# Patient Record
Sex: FEMALE | Race: WHITE | NOT HISPANIC OR LATINO | Employment: FULL TIME | URBAN - METROPOLITAN AREA
[De-identification: names, ages, dates, MRNs, and addresses within clinical notes are randomized per-mention and may not be internally consistent; named-entity substitution may affect disease eponyms.]

---

## 2023-03-13 DIAGNOSIS — E78.5 HYPERLIPIDEMIA, UNSPECIFIED HYPERLIPIDEMIA TYPE: Primary | ICD-10-CM

## 2023-03-13 RX ORDER — ROSUVASTATIN CALCIUM 5 MG/1
5 TABLET, COATED ORAL EVERY EVENING
Qty: 90 TABLET | Refills: 0 | Status: SHIPPED | COMMUNITY
Start: 2023-03-13 | End: 2023-03-27 | Stop reason: SDUPTHER

## 2023-03-13 RX ORDER — ROSUVASTATIN CALCIUM 5 MG/1
1 TABLET, COATED ORAL EVERY EVENING
COMMUNITY
Start: 2020-10-02 | End: 2023-03-13 | Stop reason: SDUPTHER

## 2023-03-13 RX ORDER — ROSUVASTATIN CALCIUM 5 MG/1
5 TABLET, COATED ORAL DAILY
Qty: 14 TABLET | Refills: 0 | Status: SHIPPED | OUTPATIENT
Start: 2023-03-13 | End: 2023-05-23 | Stop reason: SDUPTHER

## 2023-03-13 NOTE — TELEPHONE ENCOUNTER
Pt calling to request short supply sent to her local pharmacy, she does not have enough to last until her mail order is received. Please advise.     Requested Prescriptions     Pending Prescriptions Disp Refills    rosuvastatin (Crestor) 5 mg tablet 90 tablet 1     Sig: Take 1 tablet (5 mg) by mouth once daily in the evening.

## 2023-03-27 RX ORDER — ROSUVASTATIN CALCIUM 5 MG/1
5 TABLET, COATED ORAL EVERY EVENING
Qty: 90 TABLET | Refills: 1 | Status: SHIPPED | OUTPATIENT
Start: 2023-03-27 | End: 2023-05-23 | Stop reason: SDUPTHER

## 2023-03-27 NOTE — TELEPHONE ENCOUNTER
Pt presented in office for this medication refill. It has not gurjit received by her mail order yet.

## 2023-04-13 LAB
ALANINE AMINOTRANSFERASE (SGPT) (U/L) IN SER/PLAS: 38 U/L (ref 7–45)
ALBUMIN (G/DL) IN SER/PLAS: 4.3 G/DL (ref 3.4–5)
ALKALINE PHOSPHATASE (U/L) IN SER/PLAS: 70 U/L (ref 33–110)
ANION GAP IN SER/PLAS: 12 MMOL/L (ref 10–20)
ASPARTATE AMINOTRANSFERASE (SGOT) (U/L) IN SER/PLAS: 33 U/L (ref 9–39)
BILIRUBIN TOTAL (MG/DL) IN SER/PLAS: 0.5 MG/DL (ref 0–1.2)
C REACTIVE PROTEIN (MG/L) IN SER/PLAS: 0.31 MG/DL
CALCIUM (MG/DL) IN SER/PLAS: 10 MG/DL (ref 8.6–10.3)
CARBON DIOXIDE, TOTAL (MMOL/L) IN SER/PLAS: 28 MMOL/L (ref 21–32)
CHLORIDE (MMOL/L) IN SER/PLAS: 102 MMOL/L (ref 98–107)
CREATININE (MG/DL) IN SER/PLAS: 0.91 MG/DL (ref 0.5–1.05)
ERYTHROCYTE DISTRIBUTION WIDTH (RATIO) BY AUTOMATED COUNT: 12.5 % (ref 11.5–14.5)
ERYTHROCYTE MEAN CORPUSCULAR HEMOGLOBIN CONCENTRATION (G/DL) BY AUTOMATED: 32.7 G/DL (ref 32–36)
ERYTHROCYTE MEAN CORPUSCULAR VOLUME (FL) BY AUTOMATED COUNT: 92 FL (ref 80–100)
ERYTHROCYTES (10*6/UL) IN BLOOD BY AUTOMATED COUNT: 4.44 X10E12/L (ref 4–5.2)
GFR FEMALE: 73 ML/MIN/1.73M2
GLUCOSE (MG/DL) IN SER/PLAS: 101 MG/DL (ref 74–99)
HEMATOCRIT (%) IN BLOOD BY AUTOMATED COUNT: 40.7 % (ref 36–46)
HEMOGLOBIN (G/DL) IN BLOOD: 13.3 G/DL (ref 12–16)
LEUKOCYTES (10*3/UL) IN BLOOD BY AUTOMATED COUNT: 7 X10E9/L (ref 4.4–11.3)
PLATELETS (10*3/UL) IN BLOOD AUTOMATED COUNT: 279 X10E9/L (ref 150–450)
POTASSIUM (MMOL/L) IN SER/PLAS: 4 MMOL/L (ref 3.5–5.3)
PROTEIN TOTAL: 7.9 G/DL (ref 6.4–8.2)
SEDIMENTATION RATE, ERYTHROCYTE: >130 MM/H (ref 0–30)
SODIUM (MMOL/L) IN SER/PLAS: 138 MMOL/L (ref 136–145)
UREA NITROGEN (MG/DL) IN SER/PLAS: 16 MG/DL (ref 6–23)

## 2023-05-23 ENCOUNTER — OFFICE VISIT (OUTPATIENT)
Dept: PRIMARY CARE | Facility: CLINIC | Age: 58
End: 2023-05-23
Payer: COMMERCIAL

## 2023-05-23 VITALS
WEIGHT: 250 LBS | DIASTOLIC BLOOD PRESSURE: 76 MMHG | SYSTOLIC BLOOD PRESSURE: 114 MMHG | TEMPERATURE: 98.3 F | BODY MASS INDEX: 42.91 KG/M2 | HEART RATE: 62 BPM | RESPIRATION RATE: 16 BRPM | OXYGEN SATURATION: 97 %

## 2023-05-23 DIAGNOSIS — G43.909 MIGRAINE WITHOUT STATUS MIGRAINOSUS, NOT INTRACTABLE, UNSPECIFIED MIGRAINE TYPE: ICD-10-CM

## 2023-05-23 DIAGNOSIS — E78.5 HYPERLIPIDEMIA, UNSPECIFIED HYPERLIPIDEMIA TYPE: Primary | ICD-10-CM

## 2023-05-23 DIAGNOSIS — M25.50 POLYARTHRALGIA: ICD-10-CM

## 2023-05-23 DIAGNOSIS — E55.9 VITAMIN D DEFICIENCY: ICD-10-CM

## 2023-05-23 DIAGNOSIS — I10 HYPERTENSION, UNSPECIFIED TYPE: ICD-10-CM

## 2023-05-23 DIAGNOSIS — Z78.0 POST-MENOPAUSAL: ICD-10-CM

## 2023-05-23 PROBLEM — K58.9 IRRITABLE BOWEL SYNDROME: Status: ACTIVE | Noted: 2023-05-23

## 2023-05-23 PROBLEM — R70.0 ELEVATED SED RATE: Status: ACTIVE | Noted: 2023-05-23

## 2023-05-23 PROBLEM — R22.31 MASS OF RIGHT FOREARM: Status: RESOLVED | Noted: 2023-05-23 | Resolved: 2023-05-23

## 2023-05-23 PROBLEM — M79.672 CHRONIC PAIN OF LEFT HEEL: Status: ACTIVE | Noted: 2023-05-23

## 2023-05-23 PROBLEM — M67.431 GANGLION OF RIGHT WRIST: Status: RESOLVED | Noted: 2023-05-23 | Resolved: 2023-05-23

## 2023-05-23 PROBLEM — R63.2 POLYPHAGIA: Status: ACTIVE | Noted: 2023-05-23

## 2023-05-23 PROBLEM — G47.33 OBSTRUCTIVE SLEEP APNEA: Status: ACTIVE | Noted: 2023-05-23

## 2023-05-23 PROBLEM — B00.1 RECURRENT COLD SORES: Status: ACTIVE | Noted: 2023-05-23

## 2023-05-23 PROBLEM — G89.29 CHRONIC LOW BACK PAIN: Status: ACTIVE | Noted: 2023-05-23

## 2023-05-23 PROBLEM — R42 DIZZINESS AND GIDDINESS: Status: ACTIVE | Noted: 2023-05-23

## 2023-05-23 PROBLEM — M54.50 CHRONIC LOW BACK PAIN: Status: ACTIVE | Noted: 2023-05-23

## 2023-05-23 PROBLEM — M54.2 NECK PAIN ON LEFT SIDE: Status: ACTIVE | Noted: 2023-05-23

## 2023-05-23 PROBLEM — E61.1 IRON DEFICIENCY: Status: ACTIVE | Noted: 2023-05-23

## 2023-05-23 PROBLEM — N80.9 ENDOMETRIOSIS: Status: ACTIVE | Noted: 2023-05-23

## 2023-05-23 PROBLEM — R22.31 MASS OF RIGHT WRIST: Status: RESOLVED | Noted: 2023-05-23 | Resolved: 2023-05-23

## 2023-05-23 PROBLEM — M25.531 RIGHT WRIST PAIN: Status: ACTIVE | Noted: 2023-05-23

## 2023-05-23 PROBLEM — E66.9 OBESE: Status: ACTIVE | Noted: 2023-05-23

## 2023-05-23 PROBLEM — G89.29 CHRONIC PAIN OF RIGHT HEEL: Status: ACTIVE | Noted: 2023-05-23

## 2023-05-23 PROBLEM — M79.671 CHRONIC PAIN OF RIGHT HEEL: Status: ACTIVE | Noted: 2023-05-23

## 2023-05-23 PROBLEM — G89.29 CHRONIC PAIN OF LEFT HEEL: Status: ACTIVE | Noted: 2023-05-23

## 2023-05-23 PROBLEM — Z98.84 BARIATRIC SURGERY STATUS: Status: ACTIVE | Noted: 2023-05-23

## 2023-05-23 PROCEDURE — 99214 OFFICE O/P EST MOD 30 MIN: CPT | Performed by: NURSE PRACTITIONER

## 2023-05-23 RX ORDER — ACYCLOVIR 50 MG/G
CREAM TOPICAL
COMMUNITY
Start: 2015-04-16 | End: 2023-11-20 | Stop reason: SDUPTHER

## 2023-05-23 RX ORDER — ATENOLOL 50 MG/1
50 TABLET ORAL DAILY
COMMUNITY
Start: 2016-12-19 | End: 2023-05-23 | Stop reason: SDUPTHER

## 2023-05-23 RX ORDER — HYDROCHLOROTHIAZIDE 12.5 MG/1
12.5 TABLET ORAL DAILY
Qty: 90 TABLET | Refills: 3 | Status: SHIPPED | OUTPATIENT
Start: 2023-05-23 | End: 2023-10-16 | Stop reason: SDUPTHER

## 2023-05-23 RX ORDER — HYDROCHLOROTHIAZIDE 12.5 MG/1
1 TABLET ORAL DAILY
COMMUNITY
End: 2023-05-23 | Stop reason: SDUPTHER

## 2023-05-23 RX ORDER — MULTIVITAMIN
1 TABLET ORAL
COMMUNITY

## 2023-05-23 RX ORDER — CALCIUM CARBONATE 260MG(650)
TABLET,CHEWABLE ORAL
COMMUNITY
End: 2024-04-09 | Stop reason: WASHOUT

## 2023-05-23 RX ORDER — ROSUVASTATIN CALCIUM 5 MG/1
5 TABLET, COATED ORAL DAILY
Qty: 90 TABLET | Refills: 3 | Status: SHIPPED | OUTPATIENT
Start: 2023-05-23 | End: 2023-06-22 | Stop reason: SDUPTHER

## 2023-05-23 RX ORDER — KETOROLAC TROMETHAMINE 10 MG/1
TABLET, FILM COATED ORAL
COMMUNITY
Start: 2022-10-24 | End: 2023-05-23 | Stop reason: ALTCHOICE

## 2023-05-23 RX ORDER — ATENOLOL 50 MG/1
50 TABLET ORAL DAILY
Qty: 90 TABLET | Refills: 3 | Status: SHIPPED | OUTPATIENT
Start: 2023-05-23 | End: 2023-10-16 | Stop reason: SDUPTHER

## 2023-05-23 RX ORDER — FLUTICASONE PROPIONATE 50 MCG
SPRAY, SUSPENSION (ML) NASAL
COMMUNITY
Start: 2019-08-23 | End: 2023-12-11 | Stop reason: SDUPTHER

## 2023-05-23 ASSESSMENT — ENCOUNTER SYMPTOMS
SHORTNESS OF BREATH: 0
BACK PAIN: 1
FATIGUE: 0
FEVER: 0
PALPITATIONS: 0
COUGH: 0
WHEEZING: 0

## 2023-05-23 NOTE — PROGRESS NOTES
Subjective   Patient ID: Lise Herring is a 57 y.o. female who presents for Med Refill.  She states she sees a new rheumatologist, who she likes very much, and who has told her she has OA, not ankylosing spondylitis, as was previously thought. She is doing well and was referred to pain management for RFA.    Review of Systems   Constitutional:  Negative for fatigue and fever.   Respiratory:  Negative for cough, shortness of breath and wheezing.    Cardiovascular:  Negative for chest pain, palpitations and leg swelling.   Musculoskeletal:  Positive for back pain.     Objective   /76 (BP Location: Left arm) Comment (BP Location): manual  Pulse 62   Temp 36.8 °C (98.3 °F)   Resp 16   Wt 113 kg (250 lb)   LMP  (Approximate) Comment: 2013  SpO2 97%   BMI 42.91 kg/m²     Physical Exam  Vitals reviewed.   Constitutional:       Appearance: Normal appearance.   HENT:      Head: Normocephalic.   Eyes:      Conjunctiva/sclera: Conjunctivae normal.   Cardiovascular:      Rate and Rhythm: Normal rate and regular rhythm.      Pulses: Normal pulses.   Pulmonary:      Breath sounds: Normal breath sounds.   Abdominal:      General: Bowel sounds are normal.      Palpations: Abdomen is soft.   Musculoskeletal:      Cervical back: Neck supple.   Skin:     General: Skin is warm and dry.   Neurological:      General: No focal deficit present.      Mental Status: She is alert.   Psychiatric:         Mood and Affect: Mood normal.         Thought Content: Thought content normal.     Assessment/Plan   1. Hyperlipidemia, unspecified hyperlipidemia type  Lipid Panel    rosuvastatin (Crestor) 5 mg tablet      2. Vitamin D deficiency        3. Post-menopausal  Vitamin D 25-Hydroxy,Total    TSH with reflex to Free T4 if abnormal      4. Hypertension, unspecified type  hydroCHLOROthiazide (HYDRODiuril) 12.5 mg tablet      5. Polyarthralgia        6. Migraine without status migrainosus, not intractable, unspecified migraine type   atenolol (Tenormin) 50 mg tablet        Follow-up with Dr. Woodson in 6 months.

## 2023-06-22 ENCOUNTER — HOSPITAL ENCOUNTER (OUTPATIENT)
Dept: DATA CONVERSION | Facility: HOSPITAL | Age: 58
End: 2023-06-22
Attending: PAIN MEDICINE | Admitting: PAIN MEDICINE
Payer: COMMERCIAL

## 2023-06-22 DIAGNOSIS — E78.5 HYPERLIPIDEMIA, UNSPECIFIED HYPERLIPIDEMIA TYPE: ICD-10-CM

## 2023-06-22 DIAGNOSIS — M47.816 SPONDYLOSIS WITHOUT MYELOPATHY OR RADICULOPATHY, LUMBAR REGION: ICD-10-CM

## 2023-06-22 RX ORDER — ROSUVASTATIN CALCIUM 5 MG/1
5 TABLET, COATED ORAL DAILY
Qty: 90 TABLET | Refills: 0 | Status: SHIPPED | OUTPATIENT
Start: 2023-06-22 | End: 2023-08-24 | Stop reason: ALTCHOICE

## 2023-06-22 NOTE — TELEPHONE ENCOUNTER
Patient is calling stating that [facility] did not receive her prescription for     Rosuvastatin 5 mg     Patient is asking that we please send this into [facility] as she will run out of the medication soon.  Patient can be reached at [phone].    Thank You

## 2023-06-22 NOTE — TELEPHONE ENCOUNTER
Requested Prescriptions     Pending Prescriptions Disp Refills    rosuvastatin (Crestor) 5 mg tablet 90 tablet 1     Sig: Take 1 tablet (5 mg) by mouth once daily.

## 2023-07-03 ENCOUNTER — LAB (OUTPATIENT)
Dept: LAB | Facility: LAB | Age: 58
End: 2023-07-03
Payer: COMMERCIAL

## 2023-07-03 DIAGNOSIS — E78.5 HYPERLIPIDEMIA, UNSPECIFIED HYPERLIPIDEMIA TYPE: ICD-10-CM

## 2023-07-03 DIAGNOSIS — Z78.0 POST-MENOPAUSAL: ICD-10-CM

## 2023-07-03 LAB
CALCIDIOL (25 OH VITAMIN D3) (NG/ML) IN SER/PLAS: 36 NG/ML
CHOLESTEROL (MG/DL) IN SER/PLAS: 165 MG/DL (ref 0–199)
CHOLESTEROL IN HDL (MG/DL) IN SER/PLAS: 53.9 MG/DL
CHOLESTEROL/HDL RATIO: 3.1
LDL: 84 MG/DL (ref 0–99)
SEDIMENTATION RATE, ERYTHROCYTE: 27 MM/H (ref 0–30)
THYROTROPIN (MIU/L) IN SER/PLAS BY DETECTION LIMIT <= 0.05 MIU/L: 0.77 MIU/L (ref 0.44–3.98)
TRIGLYCERIDE (MG/DL) IN SER/PLAS: 134 MG/DL (ref 0–149)
VLDL: 27 MG/DL (ref 0–40)

## 2023-07-03 PROCEDURE — 84443 ASSAY THYROID STIM HORMONE: CPT

## 2023-07-03 PROCEDURE — 80061 LIPID PANEL: CPT

## 2023-07-03 PROCEDURE — 82306 VITAMIN D 25 HYDROXY: CPT

## 2023-07-03 PROCEDURE — 36415 COLL VENOUS BLD VENIPUNCTURE: CPT

## 2023-07-06 LAB
ALBUMIN ELP: 4.2 G/DL (ref 3.4–5)
ALPHA 1: 0.3 G/DL (ref 0.2–0.6)
ALPHA 2: 0.7 G/DL (ref 0.4–1.1)
BETA: 0.9 G/DL (ref 0.5–1.2)
GAMMA GLOBULIN: 1.2 G/DL (ref 0.5–1.4)
PATH REVIEW-SERUM PROTEIN ELECTROPHORESIS: NORMAL
PROTEIN ELECTROPHORESIS INTERPRETATION: NORMAL
PROTEIN TOTAL: 7.4 G/DL (ref 6.4–8.2)

## 2023-07-20 ENCOUNTER — HOSPITAL ENCOUNTER (OUTPATIENT)
Dept: DATA CONVERSION | Facility: HOSPITAL | Age: 58
End: 2023-07-20
Attending: PAIN MEDICINE | Admitting: PAIN MEDICINE
Payer: COMMERCIAL

## 2023-07-20 DIAGNOSIS — M47.816 SPONDYLOSIS WITHOUT MYELOPATHY OR RADICULOPATHY, LUMBAR REGION: ICD-10-CM

## 2023-08-24 ENCOUNTER — OFFICE VISIT (OUTPATIENT)
Dept: PRIMARY CARE | Facility: CLINIC | Age: 58
End: 2023-08-24
Payer: COMMERCIAL

## 2023-08-24 VITALS
WEIGHT: 250 LBS | RESPIRATION RATE: 16 BRPM | HEART RATE: 61 BPM | SYSTOLIC BLOOD PRESSURE: 122 MMHG | OXYGEN SATURATION: 97 % | BODY MASS INDEX: 42.91 KG/M2 | DIASTOLIC BLOOD PRESSURE: 72 MMHG | TEMPERATURE: 97.8 F

## 2023-08-24 DIAGNOSIS — E66.01 MORBID OBESITY WITH BMI OF 40.0-44.9, ADULT (MULTI): Primary | ICD-10-CM

## 2023-08-24 DIAGNOSIS — G89.29 CHRONIC LOW BACK PAIN, UNSPECIFIED BACK PAIN LATERALITY, UNSPECIFIED WHETHER SCIATICA PRESENT: ICD-10-CM

## 2023-08-24 DIAGNOSIS — G47.00 INSOMNIA, UNSPECIFIED TYPE: ICD-10-CM

## 2023-08-24 DIAGNOSIS — M54.50 CHRONIC LOW BACK PAIN, UNSPECIFIED BACK PAIN LATERALITY, UNSPECIFIED WHETHER SCIATICA PRESENT: ICD-10-CM

## 2023-08-24 LAB
AMPHETAMINE (PRESENCE) IN URINE BY SCREEN METHOD: NORMAL
BARBITURATES PRESENCE IN URINE BY SCREEN METHOD: NORMAL
BENZODIAZEPINE (PRESENCE) IN URINE BY SCREEN METHOD: NORMAL
CANNABINOIDS IN URINE BY SCREEN METHOD: NORMAL
COCAINE (PRESENCE) IN URINE BY SCREEN METHOD: NORMAL
DRUG SCREEN COMMENT URINE: NORMAL
FENTANYL URINE: NORMAL
OPIATES (PRESENCE) IN URINE BY SCREEN METHOD: NORMAL
OXYCODONE (PRESENCE) IN URINE BY SCREEN METHOD: NORMAL
PHENCYCLIDINE (PRESENCE) IN URINE BY SCREEN METHOD: NORMAL

## 2023-08-24 PROCEDURE — 99214 OFFICE O/P EST MOD 30 MIN: CPT | Performed by: FAMILY MEDICINE

## 2023-08-24 PROCEDURE — 80307 DRUG TEST PRSMV CHEM ANLYZR: CPT

## 2023-08-24 RX ORDER — PHENTERMINE HYDROCHLORIDE 37.5 MG/1
37.5 TABLET ORAL
Qty: 30 TABLET | Refills: 0 | Status: SHIPPED | OUTPATIENT
Start: 2023-08-24 | End: 2023-09-18 | Stop reason: SDUPTHER

## 2023-08-24 RX ORDER — NAPROXEN 500 MG/1
TABLET ORAL
COMMUNITY
Start: 2023-07-28

## 2023-08-24 NOTE — PATIENT INSTRUCTIONS
Behavioral counseling for weight loss done today.  I would recommend that you track your calories daily for the next 2 weeks.  If you have Internet access, you may want to try [email or web address] [email or web address] to track your caloric intake and calories burned each day.  It is FREE to join.  Afterward, there are 3500 jeffrey in a pound of fat so try and restrict your caloric intake to 500 jeffrey less than your average over the 2-week period of time.  Example: If you consumed an average of 2000 jeffrey a day during those 2 weeks then restrict your calorie intake to no more than 1500 calories a day.  By doing so, 500 jeffrey x 7 days a week equals 3500 jeffrey and thus you should lose at least a pound a week.      Also exercise is extremely important.  Try to exercise at least 3-5 times a week for 30 minutes each.  Ultimate goal would be to try to achieve 10,000 steps a day.    Try to drink 32-64 ounces of water a day.    Return to clinic in 3.5 wks for recheck, sooner should problems arise

## 2023-08-24 NOTE — PROGRESS NOTES
Subjective   Patient ID: Lise Herring is a 58 y.o. female who presents for Med Refill and Weight Loss (Not sleeping, difficult to walk. Ready to look into options. She was approved a few yrs ago to do bariatric surgery but she does not want to go that option. ).    HPI   Patient comes in today for evaluation and discussion of her weight.  She is also having difficulty sleeping as well as difficulty walking.    She only gets about 4 hours of sleep per night.  She has chronic back problems which wake her up.  She recently had 6 injections in her back with RFA about a month ago and they have not helped.  She has a difficult time walking due to her back.  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  She recently went to the [employer] with her 3 yo niece and was exhausted by the end due to her weight and her back.    She has tried weight watchers, Atkins and counting calories along with keto diet in the past.  The keto diet has worked for her but she has trouble maintaining it.  She claims that she does not eat much.  She gets up in the morning and has a small breakfast, usually does not eat lunch and then usually has an early dinner because her mom lives with her and that is when they eat.  She states that she does not count calories but she does not think she eats many per day.  She has a friend who had gastric bypass surgery and gained all her weight back and thus she does not want to explore that option right now.    5/23/2023  Lise eHrring is a 57 y.o. female who presents for Med Refill.  She states she sees a new rheumatologist, who she likes very much, and who has told her she has OA, not ankylosing spondylitis, as was previously thought. She is doing well and was referred to pain management for RFA.        5/17/2022  Patient presents today for 6-month checkup and refill of meds. She currently is without complaints. She states that she is taking her medicines as directed and is not  "having any side effects from them.    Patient is frustrated because she has plateaued and is not really losing any weight. She is not able to get much exercise because of her polyarthralgia. She has gained 54 pounds over the past few years. She would like to have help with losing weight. 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 She states the keto diet works for her but \"it is so expensive\". She has considered bariatric surgery in the past.    11/5/2021  Patient presents today for 6-month checkup and refill of meds. She currently is without complaints. She states that she is taking her medicines as directed and is not having any side effects from them. Her 80-year-old mother lives with her and she has been very cautious about exposure to Covid. She needs a refill of all her medications.    She is frustrated. She has gained 45 pounds in the last year after successfully using the keto diet to she had 36 pounds. She states she has recently gone back on the keto diet but it is much slower going this time.    4/16/2021  Patient presents today for checkup and refill of meds. She currently is without complaints. She states that she is taking her medicines as directed and is not having any side effects from them. She got her 1st Covid vaccine last Friday the 9th. She is due for a lipid panel.    9/10/2020  Patient comes in today for follow-up on her medications and for lab work. Is now seeing Dr. Burt, rheumatology for her joint pains. She has been instructed by him to try stopping her cholesterol medication to see if that helps her discomfort. I would like to get one last check of her lipid panel before we do that. She is continuing to take her other medicines as directed.    1/30/2020  Lise is here fro a follow up on her neck pain. She had radiofrequency ablation of the right sacroiliac joint on 1/23/2020 by Dr. Alexis and she states so far it is working okay.  She has had the neck pain return on the left " "side of the neck from the base of the skull down to the base of the neck. It does not go down the arm. She denies any known trigger. She has a side sleeper. She sleeps on 2 thin pillows. Has been working on losing weight and she has been using the ketodiet and \"the carb manager dayo\" and is down 10 pounds from her last visit. The steroid ten-day course did take her neck pain away completely but it is now back to the same level.    1/6/2020  MARCI SHOEMAKER presents with complaints of gradual onset of moderate neck pain, described as sharp and throbbing.   Associated symptoms include no impaired range of motion.     MARCI SHOEMAKER presents with complaints of visit for: (refill of meds)   Patient comes in today complaining of neck pain. She states she woke up with had about 3 weeks ago and has never gone away. She describes it as a throbbing pain and every so often a stabbing pain and points to the base of her skull on the left.    Patient also is experiencing bilateral heel pain more so on the left. It bothers her when going from a resting position to walking.    Patient also is not happy with her weight. It has gradually gone up from 193 back in 2017 to 233 where she is today. She has gained 12 pounds since she was here in October. She states that a water pill seems to be doing well. She used to work as a  and  but isn't doing that kind of walking anymore. She would like to explore a Keto diet. She has a friend who lost a lot of weight on it. She is on cholesterol medicine. Patient has considered bariatric surgery as well back in the spring of 2019 and had seen a dietitian and gone through the presurgery evaluation.  evaluation. Patient has tried Weight Watchers, Atkins diet and counting calories in the past.  1   9/17/19  Patient comes in today for follow-up. She was here last month for first time and has been doing well. She is here today for follow-up on her cholesterol and refill of the medication. "     8/23/19  Patient comes in today complaining of her sinuses. She just bought a new house out this way in [place] and has been spending a lot of time outside doing yard work. She claims she doesn't usually have allergies this time of year.    Patient also has a cold sore on her upper lip for about 4 days. She usually uses Zovirax cream for it. She doesn't know about the Valtrex tabs. We had a discussion about this.     MARCI SHOEMAKER presents with complaints of cold symptoms starting 1 month ago.   Associated symptoms include nasal congestion, post nasal drainage, productive cough, facial pressure, facial pain and headache, but no sneezing, no runny nose, no scratchy throat, no sore throat, no hoarseness, no dry cough, no plugged ear(s), no ear pain, no swollen lymph nodes, no wheezing, no shortness of breath, no fatigue, no weakness, no nausea, no vomiting, no diarrhea, no fever and no chills.     Review of Systems   All other systems reviewed and are negative.      Objective   /72   Pulse 61   Temp 36.6 °C (97.8 °F)   Resp 16   Wt 113 kg (250 lb)   SpO2 97%   BMI 42.91 kg/m²     Physical Exam  Vitals reviewed.   Constitutional:       Appearance: She is morbidly obese.   HENT:      Head: Normocephalic and atraumatic.      Right Ear: Tympanic membrane, ear canal and external ear normal.      Left Ear: Tympanic membrane, ear canal and external ear normal.      Nose: Nose normal.      Mouth/Throat:      Mouth: Mucous membranes are moist.      Pharynx: Oropharynx is clear.   Eyes:      Extraocular Movements: Extraocular movements intact.      Conjunctiva/sclera: Conjunctivae normal.      Pupils: Pupils are equal, round, and reactive to light.   Cardiovascular:      Rate and Rhythm: Normal rate and regular rhythm.      Heart sounds: Normal heart sounds. No murmur heard.  Pulmonary:      Effort: Pulmonary effort is normal.      Breath sounds: Normal breath sounds. No wheezing.   Abdominal:       General: Abdomen is flat. Bowel sounds are normal.      Palpations: Abdomen is soft.   Musculoskeletal:         General: Normal range of motion.   Skin:     General: Skin is warm and dry.   Neurological:      General: No focal deficit present.      Mental Status: She is alert and oriented to person, place, and time. Mental status is at baseline.   Psychiatric:         Mood and Affect: Mood normal.         Behavior: Behavior normal.         Thought Content: Thought content normal.         Judgment: Judgment normal.         Assessment/Plan   Problem List Items Addressed This Visit       Chronic low back pain    Morbid obesity with BMI of 40.0-44.9, adult (CMS/[facility]) - Primary    Relevant Medications    phentermine (Adipex-P) 37.5 mg tablet    Other Relevant Orders    Drug Screen, Urine With Reflex to Confirmation (Completed)     Other Visit Diagnoses       Insomnia, unspecified type            CSA phentermine and RUDS today.  Take new medication as directed.  Continue other medications as directed.  RTC in 3-1/2 weeks for recheck, sooner should problems arise.  Medication list reconciled.  Thank you for visiting today!      Professional services: Some of this note was completed using Dragon voice technology and sometimes the software misinterprets words. This may include unintended errors with respect to translation of words, typographical errors or grammar errors which may not have been identified prior to finalization of the chart note. Please take this into account when reading the note. Thank you.

## 2023-09-18 ENCOUNTER — OFFICE VISIT (OUTPATIENT)
Dept: PRIMARY CARE | Facility: CLINIC | Age: 58
End: 2023-09-18
Payer: COMMERCIAL

## 2023-09-18 VITALS
DIASTOLIC BLOOD PRESSURE: 82 MMHG | WEIGHT: 235 LBS | SYSTOLIC BLOOD PRESSURE: 114 MMHG | HEART RATE: 73 BPM | TEMPERATURE: 97.9 F | RESPIRATION RATE: 20 BRPM | OXYGEN SATURATION: 98 % | BODY MASS INDEX: 40.34 KG/M2

## 2023-09-18 DIAGNOSIS — E66.01 MORBID OBESITY WITH BMI OF 40.0-44.9, ADULT (MULTI): ICD-10-CM

## 2023-09-18 PROCEDURE — 99213 OFFICE O/P EST LOW 20 MIN: CPT | Performed by: FAMILY MEDICINE

## 2023-09-18 RX ORDER — PHENTERMINE HYDROCHLORIDE 37.5 MG/1
37.5 TABLET ORAL
Qty: 30 TABLET | Refills: 0 | Status: SHIPPED | OUTPATIENT
Start: 2023-09-18 | End: 2023-10-16 | Stop reason: SDUPTHER

## 2023-09-18 NOTE — PROGRESS NOTES
Subjective   Patient ID: Lise Herring is a 58 y.o. female who presents for Follow-up (For adipex).    OARRS:  Sriram Woodson DO on 9/18/2023  3:16 PM  I have personally reviewed the OARRS report for Lise Herring. I have considered the risks of abuse, dependence, addiction and diversion    Is the patient prescribed a combination of a benzodiazepine and opioid?  No    Last Urine Drug Screen / ordered today: No  Recent Results (from the past 8760 hour(s))   Drug Screen, Urine With Reflex to Confirmation    Collection Time: 08/24/23  4:10 PM   Result Value Ref Range    DRUG SCREEN COMMENT URINE SEE BELOW     Amphetamine Screen, Urine PRESUMPTIVE NEGATIVE NEGATIVE    Barbiturate Screen, Urine PRESUMPTIVE NEGATIVE NEGATIVE    BENZODIAZEPINE (PRESENCE) IN URINE BY SCREEN METHOD PRESUMPTIVE NEGATIVE NEGATIVE    Cannabinoid Screen, Urine PRESUMPTIVE NEGATIVE NEGATIVE    Cocaine Screen, Urine PRESUMPTIVE NEGATIVE NEGATIVE    Fentanyl, Ur PRESUMPTIVE NEGATIVE NEGATIVE    Opiate Screen, Urine PRESUMPTIVE NEGATIVE NEGATIVE    Oxycodone Screen, Ur PRESUMPTIVE NEGATIVE NEGATIVE    PCP Screen, Urine PRESUMPTIVE NEGATIVE NEGATIVE     Results are as expected.   Clinical rationale for not completing a Urine Drug Screen:  Done 8/24/2023    Controlled Substance Agreement:  Date of the Last Agreement: 8/24/2023  Reviewed Controlled Substance Agreement including but not limited to the benefits, risks, and alternatives to treatment with a Controlled Substance medication(s).    Anorexiants:   What is the patient's goal of therapy?  To assist in weight loss  Is this being achieved with current treatment?  Yes    I have assessed the patient's continuing efforts to lose weight.    Patient has demonstrated continued efforts to lose weight, is dedicated to the treatment program and the response to treatment.    Activities of Daily Living:   Is your overall impression that this patient is benefiting (symptom reduction outweighs side  effects) from anorexiants therapy? Yes     1. Physical Functioning: Better  2. Family Relationship: Better  3. Social Relationship: Better  4. Mood: Better  5. Sleep Patterns: Better  6. Overall Function: Better    HPI   Patient comes in today for follow-up on her phentermine medication.  So far she is doing very well with the medication.  She has having some slight dry mouth and dry eyes from the medication but otherwise tolerating it well.  She thus far has lost 15 pounds.    She lives with her mother who had a stroke about 7 years ago and cannot speak.    8/24/2023   Patient comes in today for evaluation and discussion of her weight.  She is also having difficulty sleeping as well as difficulty walking.    She only gets about 4 hours of sleep per night.  She has chronic back problems which wake her up.  She recently had 6 injections in her back with RFA about a month ago and they have not helped.  She has a difficult time walking due to her back.  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  She recently went to the [employer] with her 3 yo niece and was exhausted by the end due to her weight and her back.    She has tried weight watchers, Atkins and counting calories along with keto diet in the past.  The keto diet has worked for her but she has trouble maintaining it.  She claims that she does not eat much.  She gets up in the morning and has a small breakfast, usually does not eat lunch and then usually has an early dinner because her mom lives with her and that is when they eat.  She states that she does not count calories but she does not think she eats many per day.  She has a friend who had gastric bypass surgery and gained all her weight back and thus she does not want to explore that option right now.    Review of Systems   All other systems reviewed and are negative.      Objective   /82   Pulse 73   Temp 36.6 °C (97.9 °F)   Resp 20   Wt 107 kg (235 lb)   SpO2 98%    BMI 40.34 kg/m²     Physical Exam  Vitals reviewed.   Constitutional:       Appearance: She is morbidly obese.   HENT:      Head: Normocephalic and atraumatic.      Right Ear: Tympanic membrane, ear canal and external ear normal.      Left Ear: Tympanic membrane, ear canal and external ear normal.      Nose: Nose normal.      Mouth/Throat:      Mouth: Mucous membranes are moist.      Pharynx: Oropharynx is clear.   Eyes:      Extraocular Movements: Extraocular movements intact.      Conjunctiva/sclera: Conjunctivae normal.      Pupils: Pupils are equal, round, and reactive to light.   Cardiovascular:      Rate and Rhythm: Normal rate and regular rhythm.      Heart sounds: Normal heart sounds. No murmur heard.  Pulmonary:      Effort: Pulmonary effort is normal.      Breath sounds: Normal breath sounds. No wheezing.   Abdominal:      General: Abdomen is flat. Bowel sounds are normal.      Palpations: Abdomen is soft.   Musculoskeletal:         General: Normal range of motion.   Skin:     General: Skin is warm and dry.   Neurological:      General: No focal deficit present.      Mental Status: She is alert and oriented to person, place, and time. Mental status is at baseline.   Psychiatric:         Mood and Affect: Mood normal.         Behavior: Behavior normal.         Thought Content: Thought content normal.         Judgment: Judgment normal.         Assessment/Plan   Problem List Items Addressed This Visit       Morbid obesity with BMI of 40.0-44.9, adult (CMS/HCC)    Relevant Medications    phentermine (Adipex-P) 37.5 mg tablet   Continue all current meds.  RTC in one month for recheck, sooner should problems arise.  Medication list reconciled.  Thank you for visiting today!      Professional services: Some of this note was completed using Dragon voice technology and sometimes the software misinterprets words. This may include unintended errors with respect to translation of words, typographical errors or grammar  errors which may not have been identified prior to finalization of the chart note. Please take this into account when reading the note. Thank you.

## 2023-09-20 ENCOUNTER — TELEPHONE (OUTPATIENT)
Dept: PRIMARY CARE | Facility: CLINIC | Age: 58
End: 2023-09-20
Payer: COMMERCIAL

## 2023-09-20 NOTE — TELEPHONE ENCOUNTER
LMTCB.   Attempted to do PA through [facility] for adipex. Was sure the Pt already had this medication before so it should be a renewal PA, but they show the Pt never had it or filled it, and it is not covered in any way through her plan.

## 2023-09-20 NOTE — TELEPHONE ENCOUNTER
Patient called back when I was at lunch stating that she was returning your call about her prior authorization.  Patient can be reached at [phone].      Thank You

## 2023-10-02 NOTE — OP NOTE
PROCEDURE DETAILS    Preoperative Diagnosis:  Lumbar spondylosis, M47.816    Postoperative Diagnosis:  Lumbar spondylosis, M47.816    Surgeon: Henry Alexis  Resident/Fellow/Other Assistant: Sudha Umana    Procedure:  Radiofrequency ablation bilateral   medial nerve branch L3-L4, L4-L5  Anesthesia: No anesthesiologist associated with this case  Estimated Blood Loss: 0  Findings: None         Operative Report:   Surgical Indications  The patient is here today to receive a radiofrequency ablation of the above-mentioned nerve to assist with the pain condition.    Operative Report  The patient was taken to the procedure room, was placed into a prone positioning. The skin was prepped and draped sterilely in the normal fashion.  Under fluoroscopic guidance the medial nerve root branches were identified and the patient was throughout the procedure monitored by the nursing staff. The skin and subcutaneous tissues were anesthetized with 1% lidocaine. Then 20-gauge RF needles were  introduced into the approximation of the medial nerve branches at the above mentioned level and confirmed in AP and oblique view, with negative aspiration of heme and CSF, with positive sensory stimulation and negative motor stimulation. Then the patient  received 1 mL of 0.5% bupivacaine per site and radiofrequency ablation at temperature of 80°C for 90 seconds was achieved. Patient tolerated the procedure well, was taken to the recovery room, allowed to recover for a sufficient amount of time and  then was discharged home in stable condition. The patient was advised to followup with the [facility] to reassess the improvement on as-needed basis. Take you for allowing me to participate in the care of this patient.                        Attestation:   Note Completion:  Attending Attestation I performed the procedure without a resident         Electronic Signatures:  Henry Alexis)  (Signed 20-Jul-2023  13:29)   Authored: Post-Operative Note, Chart Review, Note Completion      Last Updated: 20-Jul-2023 13:29 by Henry Alexis)

## 2023-10-02 NOTE — OP NOTE
PROCEDURE DETAILS    Preoperative Diagnosis:  Facet syndrome, lumbar, M47.816    Postoperative Diagnosis:  Facet syndrome, lumbar, M47.816    Surgeon: Henry Alexis  Resident/Fellow/Other Assistant: Sudha Umana    Procedure:  Medial nerve branch block bilateral L3-L4, L4-L5,    Anesthesia: No anesthesiologist associated with this case  Estimated Blood Loss: 0  Findings: None         Operative Report:   Clinical Note  The patient is here today to receive diagnostic medial nerve branch block to assist with pain.    Procedure Note  The patient was taken to the procedure room, was placed into a prone position. The area was prepped and draped sterilely in the normal fashion. The patient was throughout the procedure monitored by the nursing staff. The skin and subcutaneous tissue was  anesthetized with 1% lidocaine. Then 22-gauge spinal needles were introduced into the approximation of the medial nerve branches at the above mentioned level, confirmed in AP and oblique view with negative aspiration of heme and CSF. The patient received  0.5 mL of 0.5% Marcaine per site. The patient tolerated the procedure well, was taken to the recovery room, allowed to recover for sufficient amount of time and then was discharged home in stable condition. The patient was advised to followup with the  [facility] Center to reassess the improvement and determine the need for the radiofrequency ablation.    Thank you for allowing me to participate in the care of this patient.                        Attestation:   Note Completion:  Attending Attestation I performed the procedure without a resident         Electronic Signatures:  Henry Alexis)  (Signed 22-Jun-2023 09:35)   Authored: Post-Operative Note, Chart Review, Note Completion      Last Updated: 22-Jun-2023 09:35 by Henry Alexis)

## 2023-10-16 ENCOUNTER — OFFICE VISIT (OUTPATIENT)
Dept: PRIMARY CARE | Facility: CLINIC | Age: 58
End: 2023-10-16
Payer: COMMERCIAL

## 2023-10-16 VITALS
WEIGHT: 228 LBS | RESPIRATION RATE: 16 BRPM | TEMPERATURE: 97.3 F | OXYGEN SATURATION: 93 % | BODY MASS INDEX: 39.14 KG/M2 | SYSTOLIC BLOOD PRESSURE: 114 MMHG | DIASTOLIC BLOOD PRESSURE: 74 MMHG | HEART RATE: 74 BPM

## 2023-10-16 DIAGNOSIS — E66.01 MORBID OBESITY WITH BMI OF 40.0-44.9, ADULT (MULTI): Primary | ICD-10-CM

## 2023-10-16 DIAGNOSIS — E78.5 HYPERLIPIDEMIA, UNSPECIFIED HYPERLIPIDEMIA TYPE: ICD-10-CM

## 2023-10-16 DIAGNOSIS — I10 HYPERTENSION, UNSPECIFIED TYPE: ICD-10-CM

## 2023-10-16 PROCEDURE — 99213 OFFICE O/P EST LOW 20 MIN: CPT | Performed by: NURSE PRACTITIONER

## 2023-10-16 RX ORDER — ATENOLOL 50 MG/1
50 TABLET ORAL DAILY
Qty: 90 TABLET | Refills: 1 | Status: SHIPPED | OUTPATIENT
Start: 2023-10-16 | End: 2023-12-04 | Stop reason: SDUPTHER

## 2023-10-16 RX ORDER — PHENTERMINE HYDROCHLORIDE 37.5 MG/1
37.5 TABLET ORAL
Qty: 30 TABLET | Refills: 1 | Status: SHIPPED | OUTPATIENT
Start: 2023-10-16 | End: 2023-12-11 | Stop reason: SDUPTHER

## 2023-10-16 RX ORDER — ROSUVASTATIN CALCIUM 5 MG/1
5 TABLET, COATED ORAL EVERY EVENING
Qty: 90 TABLET | Refills: 1 | Status: SHIPPED | OUTPATIENT
Start: 2023-10-16 | End: 2024-04-22 | Stop reason: SDUPTHER

## 2023-10-16 RX ORDER — HYDROCHLOROTHIAZIDE 12.5 MG/1
12.5 TABLET ORAL DAILY
Qty: 90 TABLET | Refills: 1 | Status: SHIPPED | OUTPATIENT
Start: 2023-10-16 | End: 2024-05-02 | Stop reason: SDUPTHER

## 2023-10-16 ASSESSMENT — ENCOUNTER SYMPTOMS
PALPITATIONS: 0
NAUSEA: 0
FATIGUE: 0
ABDOMINAL PAIN: 0
SHORTNESS OF BREATH: 0
DIARRHEA: 0
WHEEZING: 0

## 2023-10-16 NOTE — PROGRESS NOTES
Subjective   Lise Herring is a 58 y.o. female who presents for Follow-up (For phentermine. /No questions, concerns, or complaints. /).  HPI  Review of Systems   Constitutional:  Negative for fatigue.   Respiratory:  Negative for shortness of breath and wheezing.    Cardiovascular:  Negative for chest pain and palpitations.   Gastrointestinal:  Negative for abdominal pain, diarrhea and nausea.     Objective   Physical Exam  Vitals reviewed.   Constitutional:       Appearance: Normal appearance.   HENT:      Head: Normocephalic.   Eyes:      Conjunctiva/sclera: Conjunctivae normal.   Cardiovascular:      Rate and Rhythm: Normal rate and regular rhythm.      Pulses: Normal pulses.   Pulmonary:      Effort: Pulmonary effort is normal.      Breath sounds: Normal breath sounds.   Abdominal:      General: Bowel sounds are normal.      Palpations: Abdomen is soft.   Musculoskeletal:      Cervical back: Neck supple.      Right lower leg: No edema.      Left lower leg: No edema.   Skin:     General: Skin is warm and dry.   Neurological:      General: No focal deficit present.      Mental Status: She is alert and oriented to person, place, and time.   Psychiatric:         Mood and Affect: Mood normal.         Thought Content: Thought content normal.     /74   Pulse 74   Temp 36.3 °C (97.3 °F)   Resp 16   Wt 103 kg (228 lb)   SpO2 93%   BMI 39.14 kg/m²   Assessment/Plan   Problem List Items Addressed This Visit       Hyperlipidemia    Relevant Medications    rosuvastatin (Crestor) 5 mg tablet    HTN (hypertension)    Relevant Medications    atenolol (Tenormin) 50 mg tablet    hydroCHLOROthiazide (HYDRODiuril) 12.5 mg tablet    Morbid obesity with BMI of 40.0-44.9, adult (CMS/[facility]) - Primary     Doing well w/ Adipex. Continues to lose weight. Denies adverse effects.         Relevant Medications    phentermine (Adipex-P) 37.5 mg tablet   Follow-up in 2 months

## 2023-11-01 ENCOUNTER — APPOINTMENT (OUTPATIENT)
Dept: RADIOLOGY | Facility: CLINIC | Age: 58
End: 2023-11-01
Payer: COMMERCIAL

## 2023-11-02 ENCOUNTER — PATIENT MESSAGE (OUTPATIENT)
Dept: PRIMARY CARE | Facility: CLINIC | Age: 58
End: 2023-11-02
Payer: COMMERCIAL

## 2023-11-02 DIAGNOSIS — B00.1 RECURRENT COLD SORES: ICD-10-CM

## 2023-11-03 RX ORDER — ACYCLOVIR 50 MG/G
CREAM TOPICAL
Qty: 5 G | Refills: 11 | Status: CANCELLED | OUTPATIENT
Start: 2023-11-03

## 2023-11-03 RX ORDER — VALACYCLOVIR HYDROCHLORIDE 1 G/1
2000 TABLET, FILM COATED ORAL 2 TIMES DAILY
Qty: 4 TABLET | Refills: 0 | Status: SHIPPED | OUTPATIENT
Start: 2023-11-03 | End: 2023-11-04

## 2023-11-07 RX ORDER — VALACYCLOVIR HYDROCHLORIDE 1 G/1
2000 TABLET, FILM COATED ORAL 2 TIMES DAILY
Qty: 12 TABLET | Refills: 1 | Status: SHIPPED | OUTPATIENT
Start: 2023-11-07 | End: 2023-11-08

## 2023-11-20 ENCOUNTER — PATIENT MESSAGE (OUTPATIENT)
Dept: PRIMARY CARE | Facility: CLINIC | Age: 58
End: 2023-11-20
Payer: COMMERCIAL

## 2023-11-20 DIAGNOSIS — B00.1 RECURRENT COLD SORES: ICD-10-CM

## 2023-11-20 RX ORDER — ACYCLOVIR 50 MG/G
CREAM TOPICAL
Qty: 30 G | Refills: 0 | Status: SHIPPED | OUTPATIENT
Start: 2023-11-20 | End: 2023-11-24

## 2023-11-20 NOTE — PATIENT COMMUNICATION
Requested Prescriptions     Pending Prescriptions Disp Refills    acyclovir (Zovirax) 5 % cream 30 g 0     Sig: Apply topically 5 times a day.

## 2023-11-22 ENCOUNTER — TELEPHONE (OUTPATIENT)
Dept: PRIMARY CARE | Facility: CLINIC | Age: 58
End: 2023-11-22
Payer: COMMERCIAL

## 2023-11-22 NOTE — TELEPHONE ENCOUNTER
Patient is calling to check the status on her medication request.  Patient is getting a little worried because of the holiday coming up that she will not get her medication.  Patient can be reached at [phone].      Thank You

## 2023-11-22 NOTE — TELEPHONE ENCOUNTER
Pharmacy stated the cream needed a PA. PA sent through [facility] and awaiting determination.

## 2023-11-24 ENCOUNTER — APPOINTMENT (OUTPATIENT)
Dept: RADIOLOGY | Facility: CLINIC | Age: 58
End: 2023-11-24
Payer: COMMERCIAL

## 2023-11-24 DIAGNOSIS — B00.1 RECURRENT COLD SORES: ICD-10-CM

## 2023-11-27 ENCOUNTER — APPOINTMENT (OUTPATIENT)
Dept: PRIMARY CARE | Facility: CLINIC | Age: 58
End: 2023-11-27
Payer: COMMERCIAL

## 2023-11-29 RX ORDER — ACYCLOVIR 50 MG/G
OINTMENT TOPICAL
Qty: 15 G | Refills: 0 | OUTPATIENT
Start: 2023-11-29

## 2023-11-29 NOTE — TELEPHONE ENCOUNTER
PA approved 11/27 from 10/28/23-11/27/24.     Pharmacy notified and will notify pt when ready for . The cream is still over $100 with PA.     Please refuse this duplicate request.

## 2023-12-04 ENCOUNTER — TELEMEDICINE (OUTPATIENT)
Dept: NEUROLOGY | Facility: CLINIC | Age: 58
End: 2023-12-04
Payer: COMMERCIAL

## 2023-12-04 DIAGNOSIS — I10 HYPERTENSION, UNSPECIFIED TYPE: ICD-10-CM

## 2023-12-04 DIAGNOSIS — G43.119 INTRACTABLE MIGRAINE WITH AURA WITHOUT STATUS MIGRAINOSUS: Primary | ICD-10-CM

## 2023-12-04 PROCEDURE — 99214 OFFICE O/P EST MOD 30 MIN: CPT | Performed by: NURSE PRACTITIONER

## 2023-12-04 RX ORDER — ATENOLOL 50 MG/1
50 TABLET ORAL DAILY
Qty: 90 TABLET | Refills: 1 | Status: SHIPPED | OUTPATIENT
Start: 2023-12-04 | End: 2024-06-03 | Stop reason: SDUPTHER

## 2023-12-04 NOTE — PROGRESS NOTES
Being assessed today for follow-up of migraine.  She reports that she has been doing well on the atenolol.  She occasionally does use the Naprosyn for breakthrough headaches which will typically happen with barometric pressure changes or if she has a period of poor sleep.  She is able to manage it that way for the limited times that she needs it.  We will continue the atenolol as she has been taking it.  Denies side effects.  Discussed role of medicine, importance of taking medications, potential risks, benefits, and precautions to be taken.  Reviewed sleep hygiene and dietary modifications.  Follow-up in 6 months.    This note was created with voice recognition software and was not corrected for typographical or grammatical errors

## 2023-12-11 ENCOUNTER — OFFICE VISIT (OUTPATIENT)
Dept: PRIMARY CARE | Facility: CLINIC | Age: 58
End: 2023-12-11
Payer: COMMERCIAL

## 2023-12-11 VITALS
WEIGHT: 221 LBS | SYSTOLIC BLOOD PRESSURE: 114 MMHG | HEART RATE: 78 BPM | RESPIRATION RATE: 20 BRPM | DIASTOLIC BLOOD PRESSURE: 67 MMHG | OXYGEN SATURATION: 98 % | BODY MASS INDEX: 37.93 KG/M2 | TEMPERATURE: 97.7 F

## 2023-12-11 DIAGNOSIS — I10 PRIMARY HYPERTENSION: ICD-10-CM

## 2023-12-11 DIAGNOSIS — J32.9 CHRONIC SINUSITIS, UNSPECIFIED LOCATION: ICD-10-CM

## 2023-12-11 DIAGNOSIS — E66.01 CLASS 2 SEVERE OBESITY DUE TO EXCESS CALORIES WITH SERIOUS COMORBIDITY AND BODY MASS INDEX (BMI) OF 37.0 TO 37.9 IN ADULT (MULTI): Primary | ICD-10-CM

## 2023-12-11 PROBLEM — E66.9 OBESE: Status: RESOLVED | Noted: 2023-05-23 | Resolved: 2023-12-11

## 2023-12-11 PROBLEM — E66.812 CLASS 2 SEVERE OBESITY DUE TO EXCESS CALORIES WITH SERIOUS COMORBIDITY AND BODY MASS INDEX (BMI) OF 37.0 TO 37.9 IN ADULT: Status: ACTIVE | Noted: 2023-08-24

## 2023-12-11 PROCEDURE — 99213 OFFICE O/P EST LOW 20 MIN: CPT | Performed by: NURSE PRACTITIONER

## 2023-12-11 RX ORDER — PHENTERMINE HYDROCHLORIDE 37.5 MG/1
37.5 TABLET ORAL
Qty: 42 TABLET | Refills: 0 | Status: SHIPPED | OUTPATIENT
Start: 2023-12-11 | End: 2024-01-22

## 2023-12-11 RX ORDER — FLUTICASONE PROPIONATE 50 MCG
2 SPRAY, SUSPENSION (ML) NASAL DAILY
Qty: 16 G | Refills: 3 | Status: SHIPPED | OUTPATIENT
Start: 2023-12-11 | End: 2024-05-02 | Stop reason: SDUPTHER

## 2023-12-11 ASSESSMENT — ENCOUNTER SYMPTOMS
PALPITATIONS: 0
DIARRHEA: 0
ABDOMINAL PAIN: 0
NAUSEA: 0
FATIGUE: 0
WHEEZING: 0
FEVER: 0
SHORTNESS OF BREATH: 0

## 2023-12-11 NOTE — PROGRESS NOTES
Subjective   Lise Herring is a 58 y.o. female who presents for Follow-up (Feels lately like the adipex isnt working as well. Has not been taking it every day. / ).  Review of Systems   Constitutional:  Negative for fatigue and fever.   Respiratory:  Negative for shortness of breath and wheezing.    Cardiovascular:  Negative for chest pain and palpitations.   Gastrointestinal:  Negative for abdominal pain, diarrhea and nausea.    Objective   Physical Exam  Vitals reviewed.   Constitutional:       Appearance: Normal appearance.   HENT:      Head: Normocephalic.   Eyes:      Conjunctiva/sclera: Conjunctivae normal.   Cardiovascular:      Rate and Rhythm: Normal rate and regular rhythm.      Pulses: Normal pulses.      Heart sounds: No murmur heard.  Pulmonary:      Effort: Pulmonary effort is normal.      Breath sounds: Normal breath sounds.   Abdominal:      General: Bowel sounds are normal.      Palpations: Abdomen is soft.   Musculoskeletal:      Cervical back: Neck supple.      Right lower leg: No edema.      Left lower leg: No edema.   Skin:     General: Skin is warm and dry.   Neurological:      General: No focal deficit present.      Mental Status: She is alert and oriented to person, place, and time.   Psychiatric:         Mood and Affect: Mood normal.         Thought Content: Thought content normal.     /67   Pulse 78   Temp 36.5 °C (97.7 °F)   Resp 20   Wt 100 kg (221 lb)   SpO2 98%   BMI 37.93 kg/m²   Assessment/Plan   Problem List Items Addressed This Visit       HTN (hypertension)     Controlled. Will continue current treatment plan.           Class 2 severe obesity due to excess calories with serious comorbidity and body mass index (BMI) of 37.0 to 37.9 in adult (CMS/[facility]) - Primary     She has had good success with Adipex-P 37.5 mg daily, down from 250 lbs on 8/24/23 to 221 lbs today, which is an 11.6% weight loss. The patient has no side effects and is pleased with her progress. We will  continue the current Adipex for an additional 6 weeks based on most recent guidelines.          Relevant Medications    phentermine (Adipex-P) 37.5 mg tablet    Chronic sinusitis    Relevant Medications    fluticasone (Flonase) 50 mcg/actuation nasal spray     Follow-up with Dr. Woodson in 6 weeks.

## 2023-12-11 NOTE — ASSESSMENT & PLAN NOTE
She has had good success with Adipex-P 37.5 mg daily, down from 250 lbs on 8/24/23 to 221 lbs today, which is an 11.6% weight loss. The patient has no side effects and is pleased with her progress. We will continue the current Adipex for an additional 6 weeks based on most recent guidelines.

## 2024-01-22 ENCOUNTER — APPOINTMENT (OUTPATIENT)
Dept: PRIMARY CARE | Facility: CLINIC | Age: 59
End: 2024-01-22
Payer: COMMERCIAL

## 2024-02-02 ENCOUNTER — APPOINTMENT (OUTPATIENT)
Dept: RADIOLOGY | Facility: CLINIC | Age: 59
End: 2024-02-02
Payer: COMMERCIAL

## 2024-02-05 ENCOUNTER — APPOINTMENT (OUTPATIENT)
Dept: PRIMARY CARE | Facility: CLINIC | Age: 59
End: 2024-02-05
Payer: COMMERCIAL

## 2024-04-09 ENCOUNTER — HOSPITAL ENCOUNTER (OUTPATIENT)
Dept: RADIOLOGY | Facility: CLINIC | Age: 59
Discharge: HOME | End: 2024-04-09
Payer: COMMERCIAL

## 2024-04-09 ENCOUNTER — OFFICE VISIT (OUTPATIENT)
Dept: OBSTETRICS AND GYNECOLOGY | Facility: CLINIC | Age: 59
End: 2024-04-09
Payer: COMMERCIAL

## 2024-04-09 VITALS
SYSTOLIC BLOOD PRESSURE: 110 MMHG | DIASTOLIC BLOOD PRESSURE: 58 MMHG | WEIGHT: 224.8 LBS | BODY MASS INDEX: 38.38 KG/M2 | HEIGHT: 64 IN

## 2024-04-09 VITALS — BODY MASS INDEX: 38.93 KG/M2 | HEIGHT: 64 IN | WEIGHT: 228 LBS

## 2024-04-09 DIAGNOSIS — Z01.419 VISIT FOR GYNECOLOGIC EXAMINATION: Primary | ICD-10-CM

## 2024-04-09 DIAGNOSIS — Z12.31 ENCOUNTER FOR SCREENING MAMMOGRAM FOR MALIGNANT NEOPLASM OF BREAST: ICD-10-CM

## 2024-04-09 PROCEDURE — 77067 SCR MAMMO BI INCL CAD: CPT | Performed by: RADIOLOGY

## 2024-04-09 PROCEDURE — 99396 PREV VISIT EST AGE 40-64: CPT | Performed by: ADVANCED PRACTICE MIDWIFE

## 2024-04-09 PROCEDURE — 1036F TOBACCO NON-USER: CPT | Performed by: ADVANCED PRACTICE MIDWIFE

## 2024-04-09 PROCEDURE — 3078F DIAST BP <80 MM HG: CPT | Performed by: ADVANCED PRACTICE MIDWIFE

## 2024-04-09 PROCEDURE — 77067 SCR MAMMO BI INCL CAD: CPT

## 2024-04-09 PROCEDURE — 77063 BREAST TOMOSYNTHESIS BI: CPT | Performed by: RADIOLOGY

## 2024-04-09 PROCEDURE — 3008F BODY MASS INDEX DOCD: CPT | Performed by: ADVANCED PRACTICE MIDWIFE

## 2024-04-09 PROCEDURE — 3074F SYST BP LT 130 MM HG: CPT | Performed by: ADVANCED PRACTICE MIDWIFE

## 2024-04-09 RX ORDER — ACYCLOVIR 50 MG/G
CREAM TOPICAL
COMMUNITY
Start: 2023-11-27

## 2024-04-09 ASSESSMENT — ENCOUNTER SYMPTOMS
UNEXPECTED WEIGHT CHANGE: 1
MUSCULOSKELETAL NEGATIVE: 1
PSYCHIATRIC NEGATIVE: 1
CONSTITUTIONAL NEGATIVE: 1
BACK PAIN: 1
NEUROLOGICAL NEGATIVE: 1
SLEEP DISTURBANCE: 1
DIZZINESS: 1

## 2024-04-09 NOTE — PROGRESS NOTES
"Bertrand Herring is a 58 y.o. female who is here for an annual gyn exam.     Concerns for this visit: None    Menopause ~2018 based on symptoms. Currently asymptomatic.  LMP: hysterectomy and single oopherectomy (RSO)  Periods are absent, denies vaginal bleeding  PAP: 03/27/2023 WNL, HPV neg   History of abnormal Pap smear: no  Family history of uterine or ovarian cancer: no  Mammogram: 04/09/24   History of abnormal mammogram: no  Family history of breast cancer: no    Cherelle bahena    Exercise includes walking    Review of Systems   Constitutional:  Positive for unexpected weight change.   Musculoskeletal:  Positive for back pain.   Neurological:  Positive for dizziness.   Psychiatric/Behavioral:  Positive for sleep disturbance.        Objective   /58   Ht 1.626 m (5' 4\")   Wt 102 kg (224 lb 12.8 oz)   BMI 38.59 kg/m²   Physical Exam  Constitutional:       General: She is not in acute distress.  Cardiovascular:      Rate and Rhythm: Normal rate and regular rhythm.      Heart sounds: Normal heart sounds.   Pulmonary:      Effort: Pulmonary effort is normal.      Breath sounds: Normal breath sounds.   Chest:      Chest wall: No deformity, swelling or tenderness.   Breasts:     Right: Normal.      Left: Normal.   Abdominal:      Palpations: Abdomen is soft. There is no mass.      Tenderness: There is no abdominal tenderness.   Genitourinary:     General: Normal vulva.      Vagina: No vaginal discharge, erythema, tenderness, bleeding or lesions.      Cervix: No cervical motion tenderness, discharge, friability, lesion or cervical bleeding.      Uterus: Absent.       Adnexa:         Right: No mass.          Left: No mass.        Rectum: Normal. No anal fissure or external hemorrhoid.      Comments: Mild paleness of vaginal mucosa  Hx oophorectomy (RSO)  Lymphadenopathy:      Upper Body:      Right upper body: No supraclavicular or axillary adenopathy.      Left upper body: No supraclavicular " or axillary adenopathy.   Neurological:      Mental Status: She is alert.          Assessment/Plan: 58 y.o. yo woman for annual GYN exam    Lise was seen today for annual exam.  Diagnoses and all orders for this visit:  Visit for gynecologic examination       Points of Discussion:    Abnormal bleeding parameters that would prompt a call  Current PAP guidelines, due 2028  Self breast exam encouraged - mammogram screening by current guidelines, awaiting today's imaging results  Diet and exercise reviewed, encouraged light weights  Routine follow up with PCP for health maintenance examination encouraged   Follow up results by portal unless otherwise indicated  F/U 1 year or as needed

## 2024-04-12 ENCOUNTER — TELEPHONE (OUTPATIENT)
Dept: OBSTETRICS AND GYNECOLOGY | Facility: CLINIC | Age: 59
End: 2024-04-12
Payer: COMMERCIAL

## 2024-04-12 DIAGNOSIS — N63.0 BREAST MASS IN FEMALE: Primary | ICD-10-CM

## 2024-04-15 DIAGNOSIS — R92.8 MAMMOGRAM ABNORMAL: Primary | ICD-10-CM

## 2024-04-22 DIAGNOSIS — E78.5 HYPERLIPIDEMIA, UNSPECIFIED HYPERLIPIDEMIA TYPE: ICD-10-CM

## 2024-04-22 RX ORDER — ROSUVASTATIN CALCIUM 5 MG/1
5 TABLET, COATED ORAL EVERY EVENING
Qty: 90 TABLET | Refills: 1 | Status: SHIPPED | OUTPATIENT
Start: 2024-04-22 | End: 2024-04-23 | Stop reason: SDUPTHER

## 2024-04-22 NOTE — TELEPHONE ENCOUNTER
Rx Refill Request Telephone Encounter    Name:  Lise Herring  :  [id number]  Medication Name:  rosuvastatin (Crestor) 5 mg tablet       Specific Pharmacy location:    [facility] [address] [place] [address] Phone: [phone]   Fax: [phone]        Date of last appointment:  24  Date of next appointment:  24  Best number to reach patient:  [phone]      Patient states that she will be out of the medication before her appointment.  Patient can be reached at the number above.  Thank You

## 2024-04-22 NOTE — TELEPHONE ENCOUNTER
Requested Prescriptions     Pending Prescriptions Disp Refills    rosuvastatin (Crestor) 5 mg tablet 90 tablet 1     Sig: Take 1 tablet (5 mg) by mouth once daily in the evening.

## 2024-04-23 DIAGNOSIS — E78.5 HYPERLIPIDEMIA, UNSPECIFIED HYPERLIPIDEMIA TYPE: ICD-10-CM

## 2024-04-23 RX ORDER — ROSUVASTATIN CALCIUM 5 MG/1
5 TABLET, COATED ORAL EVERY EVENING
Qty: 14 TABLET | Refills: 0 | Status: SHIPPED | OUTPATIENT
Start: 2024-04-23 | End: 2024-05-02 | Stop reason: SDUPTHER

## 2024-04-23 NOTE — TELEPHONE ENCOUNTER
Pt needs short suppy sent to local pharmacy while she awaits her mail order script.     Requested Prescriptions     Pending Prescriptions Disp Refills    rosuvastatin (Crestor) 5 mg tablet 14 tablet 0     Sig: Take 1 tablet (5 mg) by mouth once daily in the evening for 14 days.

## 2024-04-29 ENCOUNTER — HOSPITAL ENCOUNTER (OUTPATIENT)
Dept: RADIOLOGY | Facility: HOSPITAL | Age: 59
Discharge: HOME | End: 2024-04-29
Payer: COMMERCIAL

## 2024-04-29 DIAGNOSIS — R92.8 MAMMOGRAM ABNORMAL: ICD-10-CM

## 2024-04-29 PROCEDURE — 77065 DX MAMMO INCL CAD UNI: CPT | Mod: LEFT SIDE | Performed by: RADIOLOGY

## 2024-04-29 PROCEDURE — 77061 BREAST TOMOSYNTHESIS UNI: CPT | Mod: LT

## 2024-04-29 PROCEDURE — 77061 BREAST TOMOSYNTHESIS UNI: CPT | Mod: LEFT SIDE | Performed by: RADIOLOGY

## 2024-04-29 PROCEDURE — 76642 ULTRASOUND BREAST LIMITED: CPT | Mod: LT

## 2024-04-29 PROCEDURE — 76642 ULTRASOUND BREAST LIMITED: CPT | Mod: LEFT SIDE | Performed by: RADIOLOGY

## 2024-04-29 PROCEDURE — 76982 USE 1ST TARGET LESION: CPT | Mod: LT

## 2024-05-02 ENCOUNTER — OFFICE VISIT (OUTPATIENT)
Dept: PRIMARY CARE | Facility: CLINIC | Age: 59
End: 2024-05-02
Payer: COMMERCIAL

## 2024-05-02 VITALS
BODY MASS INDEX: 39.31 KG/M2 | WEIGHT: 229 LBS | RESPIRATION RATE: 16 BRPM | TEMPERATURE: 97.1 F | OXYGEN SATURATION: 96 % | DIASTOLIC BLOOD PRESSURE: 74 MMHG | HEART RATE: 78 BPM | SYSTOLIC BLOOD PRESSURE: 108 MMHG

## 2024-05-02 DIAGNOSIS — Z83.49 FAMILY HISTORY OF THYROID DISEASE: Primary | ICD-10-CM

## 2024-05-02 DIAGNOSIS — R73.9 HYPERGLYCEMIA: ICD-10-CM

## 2024-05-02 DIAGNOSIS — J32.9 CHRONIC SINUSITIS, UNSPECIFIED LOCATION: ICD-10-CM

## 2024-05-02 DIAGNOSIS — E78.5 HYPERLIPIDEMIA, UNSPECIFIED HYPERLIPIDEMIA TYPE: ICD-10-CM

## 2024-05-02 DIAGNOSIS — E66.9 OBESITY (BMI 35.0-39.9 WITHOUT COMORBIDITY): ICD-10-CM

## 2024-05-02 DIAGNOSIS — E55.9 VITAMIN D DEFICIENCY: ICD-10-CM

## 2024-05-02 DIAGNOSIS — I10 HYPERTENSION, UNSPECIFIED TYPE: ICD-10-CM

## 2024-05-02 DIAGNOSIS — E53.8 VITAMIN B12 DEFICIENCY: ICD-10-CM

## 2024-05-02 PROCEDURE — 99213 OFFICE O/P EST LOW 20 MIN: CPT | Performed by: FAMILY MEDICINE

## 2024-05-02 RX ORDER — HYDROCHLOROTHIAZIDE 12.5 MG/1
12.5 TABLET ORAL DAILY
Qty: 90 TABLET | Refills: 1 | Status: SHIPPED | OUTPATIENT
Start: 2024-05-02 | End: 2024-10-29

## 2024-05-02 RX ORDER — FLUTICASONE PROPIONATE 50 MCG
2 SPRAY, SUSPENSION (ML) NASAL DAILY
Qty: 16 G | Refills: 3 | Status: SHIPPED | OUTPATIENT
Start: 2024-05-02

## 2024-05-02 RX ORDER — ROSUVASTATIN CALCIUM 5 MG/1
5 TABLET, COATED ORAL EVERY EVENING
Qty: 90 TABLET | Refills: 1 | Status: SHIPPED | OUTPATIENT
Start: 2024-05-02 | End: 2024-10-29

## 2024-05-02 NOTE — PROGRESS NOTES
Subjective   Patient ID: Lise Herring is a 58 y.o. female who presents for Follow-up (6 mo med fu/Did want to discuss adipex.).  HPI  Patient comes in today for discussion of weight loss.  She claims that she had not taken all of the Adipex from her last fill and had about 14 left and took about 5 of them recently and has not noticed much of an effect.  She is wondering what else that she can do to aid in weight loss.  She works for [employer] and knows they do not cover Ozempic without a diabetic diagnosis.      9/18/2023  Patient comes in today for follow-up on her phentermine medication.  So far she is doing very well with the medication.  She has having some slight dry mouth and dry eyes from the medication but otherwise tolerating it well.  She thus far has lost 15 pounds.    She lives with her mother who had a stroke about 7 years ago and cannot speak.    8/24/2023   Patient comes in today for evaluation and discussion of her weight.  She is also having difficulty sleeping as well as difficulty walking.    She only gets about 4 hours of sleep per night.  She has chronic back problems which wake her up.  She recently had 6 injections in her back with RFA about a month ago and they have not helped.  She has a difficult time walking due to her back.  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  She recently went to the [employer] with her 3 yo niece and was exhausted by the end due to her weight and her back.    She has tried weight watchers, Atkins and counting calories along with keto diet in the past.  The keto diet has worked for her but she has trouble maintaining it.  She claims that she does not eat much.  She gets up in the morning and has a small breakfast, usually does not eat lunch and then usually has an early dinner because her mom lives with her and that is when they eat.  She states that she does not count calories but she does not think she eats many per day.  She  has a friend who had gastric bypass surgery and gained all her weight back and thus she does not want to explore that option right now.      Review of Systems   All other systems reviewed and are negative.      Objective   Vitals:  /74   Pulse 78   Temp 36.2 °C (97.1 °F)   Resp 16   Wt 104 kg (229 lb)   SpO2 96%   BMI 39.31 kg/m²     Physical Exam  Vitals reviewed.   Constitutional:       Appearance: She is well-developed. She is obese.   HENT:      Head: Normocephalic and atraumatic.      Right Ear: Tympanic membrane, ear canal and external ear normal.      Left Ear: Tympanic membrane, ear canal and external ear normal.      Nose: Nose normal.      Mouth/Throat:      Mouth: Mucous membranes are moist.      Pharynx: Oropharynx is clear.   Eyes:      Extraocular Movements: Extraocular movements intact.      Conjunctiva/sclera: Conjunctivae normal.      Pupils: Pupils are equal, round, and reactive to light.   Cardiovascular:      Rate and Rhythm: Normal rate and regular rhythm.      Heart sounds: Normal heart sounds. No murmur heard.  Pulmonary:      Effort: Pulmonary effort is normal.      Breath sounds: Normal breath sounds. No wheezing.   Abdominal:      General: Abdomen is flat. Bowel sounds are normal.      Palpations: Abdomen is soft.   Musculoskeletal:         General: Normal range of motion.   Skin:     General: Skin is warm and dry.   Neurological:      General: No focal deficit present.      Mental Status: She is alert and oriented to person, place, and time. Mental status is at baseline.   Psychiatric:         Mood and Affect: Mood normal.         Behavior: Behavior normal.         Thought Content: Thought content normal.         Judgment: Judgment normal.     Assessment/Plan   Problem List Items Addressed This Visit       Hyperlipidemia    Relevant Medications    rosuvastatin (Crestor) 5 mg tablet    Other Relevant Orders    Lipid Panel    HTN (hypertension)    Relevant Medications     hydroCHLOROthiazide (Microzide) 12.5 mg tablet    Other Relevant Orders    Comprehensive Metabolic Panel    Obesity (BMI 35.0-39.9 without comorbidity)    Chronic sinusitis    Relevant Medications    fluticasone (Flonase) 50 mcg/actuation nasal spray    Vitamin B12 deficiency    Relevant Orders    CBC    Vitamin B12    Vitamin D deficiency    Relevant Orders    Vitamin D 25-Hydroxy,Total (for eval of Vitamin D levels)    Family history of thyroid disease - Primary    Relevant Orders    TSH with reflex to Free T4 if abnormal     Other Visit Diagnoses       Hyperglycemia        Relevant Orders    Hemoglobin A1C        Will contact patient with lab results when available.  Medication list reconciled.  Thank you for visiting today!      Professional services: Some of this note was completed using Dragon voice technology and sometimes the software misinterprets words. This may include unintended errors with respect to translation of words, typographical errors or grammar errors which may not have been identified prior to finalization of the chart note. Please take this into account when reading the note. Thank you.               Sriram Woodson DO 05/03/24 8:16 AM

## 2024-05-04 ENCOUNTER — LAB (OUTPATIENT)
Dept: LAB | Facility: LAB | Age: 59
End: 2024-05-04
Payer: COMMERCIAL

## 2024-05-04 DIAGNOSIS — I10 HYPERTENSION, UNSPECIFIED TYPE: ICD-10-CM

## 2024-05-04 DIAGNOSIS — E53.8 VITAMIN B12 DEFICIENCY: ICD-10-CM

## 2024-05-04 DIAGNOSIS — E78.5 HYPERLIPIDEMIA, UNSPECIFIED HYPERLIPIDEMIA TYPE: ICD-10-CM

## 2024-05-04 DIAGNOSIS — Z83.49 FAMILY HISTORY OF THYROID DISEASE: ICD-10-CM

## 2024-05-04 DIAGNOSIS — R73.9 HYPERGLYCEMIA: ICD-10-CM

## 2024-05-04 DIAGNOSIS — E55.9 VITAMIN D DEFICIENCY: ICD-10-CM

## 2024-05-04 LAB
25(OH)D3 SERPL-MCNC: 33 NG/ML (ref 30–100)
ALBUMIN SERPL BCP-MCNC: 4.4 G/DL (ref 3.4–5)
ALP SERPL-CCNC: 67 U/L (ref 33–110)
ALT SERPL W P-5'-P-CCNC: 21 U/L (ref 7–45)
ANION GAP SERPL CALC-SCNC: 13 MMOL/L (ref 10–20)
AST SERPL W P-5'-P-CCNC: 19 U/L (ref 9–39)
BILIRUB SERPL-MCNC: 0.8 MG/DL (ref 0–1.2)
BUN SERPL-MCNC: 16 MG/DL (ref 6–23)
CALCIUM SERPL-MCNC: 9.5 MG/DL (ref 8.6–10.3)
CHLORIDE SERPL-SCNC: 105 MMOL/L (ref 98–107)
CHOLEST SERPL-MCNC: 175 MG/DL (ref 0–199)
CHOLESTEROL/HDL RATIO: 3.3
CO2 SERPL-SCNC: 25 MMOL/L (ref 21–32)
CREAT SERPL-MCNC: 0.77 MG/DL (ref 0.5–1.05)
EGFRCR SERPLBLD CKD-EPI 2021: 90 ML/MIN/1.73M*2
ERYTHROCYTE [DISTWIDTH] IN BLOOD BY AUTOMATED COUNT: 11.9 % (ref 11.5–14.5)
EST. AVERAGE GLUCOSE BLD GHB EST-MCNC: 117 MG/DL
GLUCOSE SERPL-MCNC: 102 MG/DL (ref 74–99)
HBA1C MFR BLD: 5.7 %
HCT VFR BLD AUTO: 39.8 % (ref 36–46)
HDLC SERPL-MCNC: 53.3 MG/DL
HGB BLD-MCNC: 13.1 G/DL (ref 12–16)
LDLC SERPL CALC-MCNC: 89 MG/DL
MCH RBC QN AUTO: 30.5 PG (ref 26–34)
MCHC RBC AUTO-ENTMCNC: 32.9 G/DL (ref 32–36)
MCV RBC AUTO: 93 FL (ref 80–100)
NON HDL CHOLESTEROL: 122 MG/DL (ref 0–149)
NRBC BLD-RTO: 0 /100 WBCS (ref 0–0)
PLATELET # BLD AUTO: 237 X10*3/UL (ref 150–450)
POTASSIUM SERPL-SCNC: 4.1 MMOL/L (ref 3.5–5.3)
PROT SERPL-MCNC: 7.1 G/DL (ref 6.4–8.2)
RBC # BLD AUTO: 4.29 X10*6/UL (ref 4–5.2)
SODIUM SERPL-SCNC: 139 MMOL/L (ref 136–145)
TRIGL SERPL-MCNC: 162 MG/DL (ref 0–149)
TSH SERPL-ACNC: 0.81 MIU/L (ref 0.44–3.98)
VIT B12 SERPL-MCNC: 401 PG/ML (ref 211–911)
VLDL: 32 MG/DL (ref 0–40)
WBC # BLD AUTO: 6.2 X10*3/UL (ref 4.4–11.3)

## 2024-05-04 PROCEDURE — 80053 COMPREHEN METABOLIC PANEL: CPT

## 2024-05-04 PROCEDURE — 80061 LIPID PANEL: CPT

## 2024-05-04 PROCEDURE — 82306 VITAMIN D 25 HYDROXY: CPT

## 2024-05-04 PROCEDURE — 85027 COMPLETE CBC AUTOMATED: CPT

## 2024-05-04 PROCEDURE — 83036 HEMOGLOBIN GLYCOSYLATED A1C: CPT

## 2024-05-04 PROCEDURE — 36415 COLL VENOUS BLD VENIPUNCTURE: CPT

## 2024-05-04 PROCEDURE — 82607 VITAMIN B-12: CPT

## 2024-05-04 PROCEDURE — 84443 ASSAY THYROID STIM HORMONE: CPT

## 2024-05-09 DIAGNOSIS — E66.9 OBESITY (BMI 35.0-39.9 WITHOUT COMORBIDITY): Primary | ICD-10-CM

## 2024-05-09 RX ORDER — TOPIRAMATE 25 MG/1
25 TABLET ORAL 2 TIMES DAILY
Qty: 60 TABLET | Refills: 5 | Status: CANCELLED | OUTPATIENT
Start: 2024-05-09 | End: 2024-11-05

## 2024-05-09 RX ORDER — TOPIRAMATE 25 MG/1
TABLET ORAL
Qty: 100 TABLET | Refills: 1 | Status: SHIPPED | OUTPATIENT
Start: 2024-05-09

## 2024-05-10 NOTE — RESULT ENCOUNTER NOTE
Michael Lezama,    Your vitamin D level was low normal at 33.  It should be between 30 and 100 the closer to 50 the better. It is an important vitamin for your heart, lungs, immune system and bones among other things in your body. Would recommend over the counter vitamin D3 2000 units daily to get it into and keep it in the normal range and recheck in September 2024.     All the rest of your labs are good.    I would suggest since we do not have a diagnosis of Diabetes and since she did not have much success with the phentermine lets try medicine called Topamax.  It is a medicine that helps curb your appetite.  We will titrate it from 25 mg once a day up to 50 mg twice a day.  Since we are just starting and I will send a prescription for a 2-year [facility] in [place].     Let me see you back in the office when you have titrated it up to the 50 mg twice a day or let me know before then if you have any side effects from the medicine.  Usually it is well-tolerated.    Have a Great Day and Weekend!!!    Dr. Woodson

## 2024-05-30 ENCOUNTER — OFFICE VISIT (OUTPATIENT)
Dept: PAIN MEDICINE | Facility: CLINIC | Age: 59
End: 2024-05-30
Payer: COMMERCIAL

## 2024-05-30 VITALS
SYSTOLIC BLOOD PRESSURE: 141 MMHG | HEART RATE: 78 BPM | DIASTOLIC BLOOD PRESSURE: 86 MMHG | TEMPERATURE: 97.1 F | OXYGEN SATURATION: 99 %

## 2024-05-30 DIAGNOSIS — M47.816 SPONDYLOSIS OF LUMBAR REGION WITHOUT MYELOPATHY OR RADICULOPATHY: Primary | ICD-10-CM

## 2024-05-30 PROCEDURE — 99213 OFFICE O/P EST LOW 20 MIN: CPT | Performed by: NURSE PRACTITIONER

## 2024-05-30 ASSESSMENT — PATIENT HEALTH QUESTIONNAIRE - PHQ9
1. LITTLE INTEREST OR PLEASURE IN DOING THINGS: NOT AT ALL
2. FEELING DOWN, DEPRESSED OR HOPELESS: NOT AT ALL
SUM OF ALL RESPONSES TO PHQ9 QUESTIONS 1 AND 2: 0

## 2024-05-30 ASSESSMENT — COLUMBIA-SUICIDE SEVERITY RATING SCALE - C-SSRS
2. HAVE YOU ACTUALLY HAD ANY THOUGHTS OF KILLING YOURSELF?: NO
1. IN THE PAST MONTH, HAVE YOU WISHED YOU WERE DEAD OR WISHED YOU COULD GO TO SLEEP AND NOT WAKE UP?: NO
6. HAVE YOU EVER DONE ANYTHING, STARTED TO DO ANYTHING, OR PREPARED TO DO ANYTHING TO END YOUR LIFE?: NO

## 2024-05-30 ASSESSMENT — PAIN - FUNCTIONAL ASSESSMENT: PAIN_FUNCTIONAL_ASSESSMENT: 0-10

## 2024-05-30 ASSESSMENT — ENCOUNTER SYMPTOMS
OCCASIONAL FEELINGS OF UNSTEADINESS: 0
LOSS OF SENSATION IN FEET: 0

## 2024-05-30 ASSESSMENT — PAIN SCALES - GENERAL: PAINLEVEL_OUTOF10: 7

## 2024-05-30 NOTE — PROGRESS NOTES
Patient here today with lower back pain. Patient states she had an RFA 1 year ago to lower back. Pain started notice pain coming back 1 month ago and get worse over the past 2 week.

## 2024-05-30 NOTE — H&P
History Of Present Illness  Lise Herring is a 58 y.o. female presenting chronic back pain. She had RFA bilateral L3-4, L4-5 done on 7/20/2023, she confirms today that she was completely pain free for the past 9 months plus after the procedure, until about 2 weeks ago, she had been experiencing a lot of back pain. Her level of pain today is about 7/10, she takes over the counter Tylenol, Ibuprofen for pain.  She denies any recent injury or falls. OARRS obtained and reviewed, no abuse or misuse with prescribed medication noted.  She is still able to perform activities of daily living independently.     Past Medical History  Past Medical History:   Diagnosis Date    Displacement of lumbar intervertebral disc without myelopathy 06/19/2007    Encounter for gynecological examination (general) (routine) without abnormal findings 09/04/2019    Well woman exam with routine gynecological exam    Ganglion of right wrist 05/23/2023    Mass of right wrist 05/23/2023    Migraine, unspecified, not intractable, without status migrainosus 10/24/2022    Migraine    Personal history of other specified conditions     History of vertigo    Primary cough headache 01/10/2019    Primary cough headache       Surgical History  Past Surgical History:   Procedure Laterality Date    OTHER SURGICAL HISTORY  04/18/2019    Hysterectomy        Social History  She reports that she has quit smoking. Her smoking use included cigarettes. She has been exposed to tobacco smoke. She has never used smokeless tobacco. She reports that she does not drink alcohol and does not use drugs.    Family History  No family history on file.     Allergies  Atorvastatin, Codeine, Hydrocodone-acetaminophen, Other, and Verapamil    Review of Systems    Review of systems x 10 is negative.   No recent injury or falls reported.   No recent change in medical condition reported.   No recent weakness reported.   Still able to control bowel and bladder function.  Denies any  problem with constipation.   Denies fever, cough, shortness of breath recently.   No interval change with medication/health issues reported.  Denies opioids diversion and abuse. Denies overuse of pain medications.    Physical Exam   Awake,alert, no acute distress, appropriate.  Spine is of normal curvature.  Full ROM on all 4 extremities, sensation and motor intact, no vascular compromise.  No pedal edema, normal gait.  Skin warm, dry, intact, turgor is normal.  Denies any numbness, tingling.    Last Recorded Vitals  Blood pressure 141/86, pulse 78, temperature 36.2 °C (97.1 °F), temperature source Temporal, SpO2 99%.    Relevant Results  No recent imaging noted.     Assessment/Plan     Discussed about repeating the RFA lumbar area. She agrees to this since she states that she had a good positive response to this procedure. She will be scheduled for Radiofrequency ablation bilateral lumbar L3-4, L4-5 under fluoroscopic guidance for control of back pain. Her Oswestry score is 22, moderate disability level.  Follow up in 3 months time or as needed basis  Explained plan to this patient, and patient verbalized understanding and agreement with the plan. If there is questions or concerns, please feel free to contact me to clarify at [phone], M-F 8-4 PM.    Chronic low back pain associated with lumbago, lumbar spondylosis without myelopathy or radiculopathy tried and failed conservative management of Physical therapy and use of nonsteroidal antiinflammatory medications.       I spent 35 minutes in the professional and overall care of this patient.      Carley Tian, APRN-CNP

## 2024-06-03 ENCOUNTER — TELEMEDICINE (OUTPATIENT)
Dept: NEUROLOGY | Facility: CLINIC | Age: 59
End: 2024-06-03
Payer: COMMERCIAL

## 2024-06-03 DIAGNOSIS — G43.119 INTRACTABLE MIGRAINE WITH AURA WITHOUT STATUS MIGRAINOSUS: Primary | ICD-10-CM

## 2024-06-03 DIAGNOSIS — I10 HYPERTENSION, UNSPECIFIED TYPE: ICD-10-CM

## 2024-06-03 PROCEDURE — 99214 OFFICE O/P EST MOD 30 MIN: CPT | Performed by: NURSE PRACTITIONER

## 2024-06-03 PROCEDURE — 3008F BODY MASS INDEX DOCD: CPT | Performed by: NURSE PRACTITIONER

## 2024-06-03 RX ORDER — ATENOLOL 50 MG/1
50 TABLET ORAL DAILY
Qty: 90 TABLET | Refills: 1 | Status: SHIPPED | OUTPATIENT
Start: 2024-06-03 | End: 2024-11-30

## 2024-06-03 RX ORDER — ATENOLOL 50 MG/1
50 TABLET ORAL DAILY
Qty: 90 TABLET | Refills: 3 | OUTPATIENT
Start: 2024-06-03

## 2024-06-03 NOTE — PROGRESS NOTES
Patient being assessed today for follow-up of migraine.  She reports that the atenolol is working well for her.  She does have an occasional breakthrough migraine couple times a month that she does utilize the naproxen for which is effective and manageable for her.  Would like to continue the atenolol she has been taking it.  Discussed role of medicine, importance of taking medications, potential risks, benefits, and precautions to be taken.  Reviewed sleep hygiene and dietary modifications.  Follow-up in 6 months.    This note was created with voice recognition software and was not corrected for typographical or grammatical errors

## 2024-06-27 ENCOUNTER — HOSPITAL ENCOUNTER (OUTPATIENT)
Dept: RADIOLOGY | Facility: HOSPITAL | Age: 59
Discharge: HOME | End: 2024-06-27
Payer: COMMERCIAL

## 2024-06-27 ENCOUNTER — HOSPITAL ENCOUNTER (OUTPATIENT)
Dept: PAIN MEDICINE | Facility: CLINIC | Age: 59
Discharge: HOME | End: 2024-06-27
Payer: COMMERCIAL

## 2024-06-27 VITALS
TEMPERATURE: 96.6 F | HEART RATE: 60 BPM | RESPIRATION RATE: 18 BRPM | OXYGEN SATURATION: 95 % | DIASTOLIC BLOOD PRESSURE: 77 MMHG | SYSTOLIC BLOOD PRESSURE: 143 MMHG

## 2024-06-27 DIAGNOSIS — M47.816 SPONDYLOSIS OF LUMBAR REGION WITHOUT MYELOPATHY OR RADICULOPATHY: ICD-10-CM

## 2024-06-27 PROCEDURE — 64636 DESTROY L/S FACET JNT ADDL: CPT | Mod: 50 | Performed by: PAIN MEDICINE

## 2024-06-27 PROCEDURE — 2500000004 HC RX 250 GENERAL PHARMACY W/ HCPCS (ALT 636 FOR OP/ED): Performed by: PAIN MEDICINE

## 2024-06-27 PROCEDURE — 64635 DESTROY LUMB/SAC FACET JNT: CPT | Mod: 50 | Performed by: PAIN MEDICINE

## 2024-06-27 PROCEDURE — 64636 DESTROY L/S FACET JNT ADDL: CPT | Performed by: PAIN MEDICINE

## 2024-06-27 PROCEDURE — 64635 DESTROY LUMB/SAC FACET JNT: CPT | Performed by: PAIN MEDICINE

## 2024-06-27 RX ORDER — LIDOCAINE IN NACL,ISO-OSMOT/PF 30 MG/3 ML
10 SYRINGE (ML) INJECTION ONCE
Status: COMPLETED | OUTPATIENT
Start: 2024-06-27 | End: 2024-06-27

## 2024-06-27 RX ORDER — BUPIVACAINE HYDROCHLORIDE 5 MG/ML
10 INJECTION, SOLUTION PERINEURAL ONCE
Status: COMPLETED | OUTPATIENT
Start: 2024-06-27 | End: 2024-06-27

## 2024-06-27 ASSESSMENT — PAIN SCALES - GENERAL: PAINLEVEL_OUTOF10: 8

## 2024-06-27 ASSESSMENT — PAIN - FUNCTIONAL ASSESSMENT: PAIN_FUNCTIONAL_ASSESSMENT: 0-10

## 2024-06-27 NOTE — DISCHARGE INSTRUCTIONS
Post-injection instructions FOR Radiofrequency Ablation    Your radiofrequency ablation was completed today is not unusual to have some exacerbation of the pain before you get better.  Radiofrequency ablation takes an average of 2 to 3 weeks before it completely starts showing full results      Activity:  RETURN TO NORMAL ACTIVITY once the sedation is completely worn off do not sign any legal document for the first 24 hours do not drive or operate machinery for the first 24 hours until the sedation effect is completely worn off    Bandages: Remove after 24 hours     Showering/Bathing: You may shower after bandage is removed     May use ice at the site of the injection for the first 24 hours      Call the OFFICE immediately: if you notice:     Excessive bleeding from procedure site (brisk bright red bleeding from the site or bleeding that soaks the bandages or does not stop)   Severe headache  Inability to walk, leg or arm weakness or numbness that is worse after the procedure   Uncontrolled pain   New urinary or fecal incontinence   Signs of infection: Fever above 101.5F, redness, swelling, pus or drainage from the site    Please contact the pain clinic at [phone]  in 3 weeks to report results or with any further questions or concerns

## 2024-09-16 ENCOUNTER — OFFICE VISIT (OUTPATIENT)
Dept: PAIN MEDICINE | Facility: CLINIC | Age: 59
End: 2024-09-16
Payer: COMMERCIAL

## 2024-09-16 DIAGNOSIS — M46.1 SACROILIITIS (CMS-HCC): Primary | ICD-10-CM

## 2024-09-16 PROCEDURE — 99214 OFFICE O/P EST MOD 30 MIN: CPT | Performed by: PAIN MEDICINE

## 2024-09-16 RX ORDER — SODIUM CHLORIDE, SODIUM LACTATE, POTASSIUM CHLORIDE, CALCIUM CHLORIDE 600; 310; 30; 20 MG/100ML; MG/100ML; MG/100ML; MG/100ML
20 INJECTION, SOLUTION INTRAVENOUS CONTINUOUS
OUTPATIENT
Start: 2024-09-16

## 2024-09-16 ASSESSMENT — COLUMBIA-SUICIDE SEVERITY RATING SCALE - C-SSRS
6. HAVE YOU EVER DONE ANYTHING, STARTED TO DO ANYTHING, OR PREPARED TO DO ANYTHING TO END YOUR LIFE?: NO
1. IN THE PAST MONTH, HAVE YOU WISHED YOU WERE DEAD OR WISHED YOU COULD GO TO SLEEP AND NOT WAKE UP?: NO
2. HAVE YOU ACTUALLY HAD ANY THOUGHTS OF KILLING YOURSELF?: NO

## 2024-09-16 ASSESSMENT — PAIN SCALES - GENERAL: PAINLEVEL_OUTOF10: 8

## 2024-09-16 ASSESSMENT — PAIN DESCRIPTION - DESCRIPTORS: DESCRIPTORS: ACHING;DULL;STABBING;SHARP

## 2024-09-16 ASSESSMENT — PAIN - FUNCTIONAL ASSESSMENT: PAIN_FUNCTIONAL_ASSESSMENT: 0-10

## 2024-09-16 NOTE — H&P
History Of Present Illness  Lise Herring is a 59 y.o. female presenting with chronic back pain she had a significant improvement with the radiofrequency ablation of the medial nerve branches that was performed in June prior to that 4 years ago and January 2020 she received radiofrequency ablation of the S1, S2, S3 and S4 branches targeting her SI joint and at that time she had a great response with the bilateral sacroiliac joint pain currently she believes she have a reoccurrence of the symptoms that she describes around her buttock area aggravated by standing for too long or for sitting for too long.  Rating the pain at an 8 out of 10.  She has been on ibuprofen Advil Aleve and using ice with no significant improvement she is hoping for a repeat intervention of the SI joint some of her pain has been radiating towards the posterior aspect of her thigh.  Describing the pain as a sharp sensation.     Past Medical History  Past Medical History:   Diagnosis Date    Displacement of lumbar intervertebral disc without myelopathy 06/19/2007    Encounter for gynecological examination (general) (routine) without abnormal findings 09/04/2019    Well woman exam with routine gynecological exam    Ganglion of right wrist 05/23/2023    Mass of right wrist 05/23/2023    Migraine, unspecified, not intractable, without status migrainosus 10/24/2022    Migraine    Personal history of other specified conditions     History of vertigo    Primary cough headache 01/10/2019    Primary cough headache     Surgical History  Past Surgical History:   Procedure Laterality Date    OTHER SURGICAL HISTORY  04/18/2019    Hysterectomy     Social History  She reports that she has quit smoking. Her smoking use included cigarettes. She has been exposed to tobacco smoke. She has never used smokeless tobacco. She reports that she does not drink alcohol and does not use drugs.    Family History  No family history on file.     Allergies  Allergies    Allergen Reactions    Atorvastatin Other     Body aches     Codeine Nausea/vomiting    Hydrocodone-Acetaminophen Nausea Only and Other    Other Diarrhea    Verapamil Nausea Only and Other     Review of Systems   All 13 systems were reviewed and are within normal levels except as noted below or per HPI. Positive and pertinent negative responses are noted below or in the HPI   Denied any fever or chills. No weight loss and no night sweats. No cough or sputum production. No diarrhea   No constipation  No bladder and bowel incontinence and no other changes in bladder and bowel. No skin changes.   Denied opioids diversion and abuse and denies alcoholism. Denies overuse of  pain medications.   Physical Exam       Past medical history no interval changes has been noted    On physical examination    General   Alert, oriented x3 pleasant and cooperative. Does not look in any major distress.    HEENT  Pupils normal in size. Ears, nose, mouth, and throat appear to be in normal condition.  Head atraumatic      No signs of sedation or signs of withdrawal apparent.    Psychiatric   No signs of depression apparent.    Neuro   No focal neurological deficit apparent. Ambulation at baseline.  Positive Alberto sign ,positive SI distraction test, positive compression test and positive thigh thrush test'  Positive tenderness upon the palpation of the bilateral SI joint described.  Patient    Respiratory  No respiratory distress     Abdomen  no distention     Skin  No skin markings supportive of recent IV drug usage .    Cardiovascular  Regular rate and rhythm    Last Recorded Vitals  There were no vitals taken for this visit.    Assessment/Plan   59 years old with history and physical examination supportive of bilateral sacroiliitis with a prior positive response to radiofrequency ablation of the sacral branches with reoccurrence of the symptoms in 4 years from the last procedure    Plan  Knowing that the patient is symptomatic at the  SI joints bilaterally and that she had a positive response in the past and would recommend a bilateral SI joint injection under fluoroscopic guidance with injection of contrast material to confirm needle placement I will reevaluate the patient after the performance of the SI joint injection for further recommendation as her case progress benefits and risk were discussed with patient and she would like to proceed      The above clinical summary has been dictated with voice recognition software. It has not been proofread for grammatical errors, typographical mistakes, or other semantic inconsistencies.    Thank you for visiting our office today. It was our pleasure to take part in your healthcare.     Please do not hesitate to contact the pain clinic after your visit with any questions or concerns at  M-F 8-4 pm       Henry Alexis M.D.  Medical Director , Division of Pain Medicine [facility]   of Anesthesiology and Pain Medicine  [facility] [employer]     [place] [address] [address] [place] [address]     Office: [phone]  Fax: [phone]      Henry Alexis MD

## 2024-10-17 ENCOUNTER — HOSPITAL ENCOUNTER (OUTPATIENT)
Dept: PAIN MEDICINE | Facility: CLINIC | Age: 59
Discharge: HOME | End: 2024-10-17
Payer: COMMERCIAL

## 2024-10-17 VITALS
RESPIRATION RATE: 18 BRPM | OXYGEN SATURATION: 94 % | SYSTOLIC BLOOD PRESSURE: 127 MMHG | TEMPERATURE: 97.7 F | HEART RATE: 65 BPM | DIASTOLIC BLOOD PRESSURE: 76 MMHG

## 2024-10-17 DIAGNOSIS — M46.1 SACROILIITIS (CMS-HCC): ICD-10-CM

## 2024-10-17 PROCEDURE — 27096 INJECT SACROILIAC JOINT: CPT | Performed by: PAIN MEDICINE

## 2024-10-17 PROCEDURE — 27096 INJECT SACROILIAC JOINT: CPT | Mod: 50 | Performed by: PAIN MEDICINE

## 2024-10-17 PROCEDURE — 2500000004 HC RX 250 GENERAL PHARMACY W/ HCPCS (ALT 636 FOR OP/ED): Performed by: PAIN MEDICINE

## 2024-10-17 PROCEDURE — 2550000001 HC RX 255 CONTRASTS: Performed by: PAIN MEDICINE

## 2024-10-17 RX ORDER — BUPIVACAINE HYDROCHLORIDE 5 MG/ML
INJECTION, SOLUTION EPIDURAL; INTRACAUDAL AS NEEDED
Status: COMPLETED | OUTPATIENT
Start: 2024-10-17 | End: 2024-10-17

## 2024-10-17 RX ORDER — LIDOCAINE HYDROCHLORIDE 10 MG/ML
INJECTION, SOLUTION EPIDURAL; INFILTRATION; INTRACAUDAL; PERINEURAL AS NEEDED
Status: COMPLETED | OUTPATIENT
Start: 2024-10-17 | End: 2024-10-17

## 2024-10-17 RX ORDER — METHYLPREDNISOLONE ACETATE 80 MG/ML
INJECTION, SUSPENSION INTRA-ARTICULAR; INTRALESIONAL; INTRAMUSCULAR; SOFT TISSUE AS NEEDED
Status: COMPLETED | OUTPATIENT
Start: 2024-10-17 | End: 2024-10-17

## 2024-10-17 ASSESSMENT — PAIN SCALES - GENERAL: PAINLEVEL_OUTOF10: 8

## 2024-10-17 ASSESSMENT — PAIN - FUNCTIONAL ASSESSMENT: PAIN_FUNCTIONAL_ASSESSMENT: 0-10

## 2024-10-17 ASSESSMENT — PAIN DESCRIPTION - DESCRIPTORS: DESCRIPTORS: ACHING;DULL;STABBING;SHARP

## 2024-10-17 NOTE — DISCHARGE INSTRUCTIONS
Sacroiliac Joint Pain    About this topic  The spine ends in a set of 5 fused bones. They are called the sacrum. They join the pelvis at the sacroiliac joint. This is also called the SI joint. Strong bands of tissue called ligaments hold this joint together. Normally, there is very little movement at this joint. Its job is to absorb shock and take stress off of the spine. You can have SI joint pain if this joint is irritated.  What are the causes?  Sometimes, the exact cause of SI pain is unknown. It is not always known if the pain is in the joint or in the ligaments around the joint. The pain may be caused by wear and tear on the joint from arthritis. Pregnancy causes this joint to become looser. Sometimes, the pain may be caused by swelling from problems like gout or an injury. Infection or a fracture can also cause SI pain.  What can make this more likely to happen?  You are more likely to have this problem if you have had an injury to your spine, pelvis, or buttocks. Someone with a history of being pregnant a few times is more likely to have problems with her SI joint. Legs that are not the same length can cause pain as well. Some infections may cause problems with the SI joint.  What are the main signs?  Pain in the lower back or buttocks. It may also be felt in the hips or pelvis. Some people feel the pain in the groin or back of the legs. This pain is often worse with activity like climbing stairs or standing for a long time.  Numbness or tingling in the upper leg  Feeling of weakness in the leg  Stiffness  Feeling unstable when moving  How does the doctor diagnose this health problem?  The doctor will do an exam and feel around your lower back. Your doctor will have you bend and twist at the waist to see what makes the pain worse. Your doctor may have you move and push and pull on your legs to test your motion and strength. Your doctor will check if the muscles in the back or legs are tight. Your doctor may  check the feeling and reflexes in your legs.  The doctor may order:  X-ray  CT or MRI scan  Bone scan  Lab tests  Diagnostic injection ? injecting a drug into the joint to see if relief is given  How does the doctor treat this health problem?  Rest from activities that make the problem worse  Ice  Heat may be used later but not right away. Heat can make swelling worse.  Special belt worn low on the hips called an SI belt. It helps to hold the joint tightly together.  Electrical stimulation  Exercises  Chiropractic manipulation  Radiofrequency ablation ? A treatment where small nerves around the joint are burned so they are numb. This does not always work. It may only last for a few years.  Surgery may be needed if other treatment plans do not work.  Injections (cortisone)  Are there other health problems to treat?  If the problem is caused by an infection, inflammatory arthritis, or ankylosing spondylosis, these problems will need to be treated.  What drugs may be needed?  The doctor may order drugs to:  Help with pain and swelling  Fight an infection  The doctor may give you a shot to help with pain and swelling. Talk with your doctor about possible risks with this shot.  What problems could happen?  Long-term back pain  Weight gain, less muscle strength and flexibility, weaker bones  Arthritis  Need for surgery  Infection  What can be done to prevent this health problem?  Stay active and work out to keep your muscles strong and flexible. Warm up slowly and stretch before you exercise.  Use good posture.  Use proper ways to lift and bend:  Spread your feet apart so you have a good base of support. Then, bend with your knees when you  something from the ground.  When lifting and moving an object, keep your back straight. Keep the object as close to your body as possible. Do not twist. Instead, move your feet to the direction you are going.  Follow your doctor's orders about weight lifting.      Your pain may  not be gone immediately after the procedure--it usually takes the steroid 3-5 days to start working.   It may take several weeks for the medicine to reach its' full effect.   Pay attention to how much pain relief (what percentage compared to before the procedure) you get and for how long it lasts.     Activity: Avoid strenuous activity for 24 hours. After that return to your normal activity level.     Bandages: Remove after 24 hours     Showering/Bathing: You may shower after bandage is removed   Follow up: CALL OFFICE IN 7 DAYS [phone] LEAVE MESSAGE ABOUT THE RELIEF THAT WAS OBTAINED

## 2024-10-24 ENCOUNTER — APPOINTMENT (OUTPATIENT)
Dept: PRIMARY CARE | Facility: CLINIC | Age: 59
End: 2024-10-24
Payer: COMMERCIAL

## 2024-10-29 ENCOUNTER — TELEPHONE (OUTPATIENT)
Dept: PAIN MEDICINE | Facility: CLINIC | Age: 59
End: 2024-10-29
Payer: COMMERCIAL

## 2024-10-29 DIAGNOSIS — M46.1 BILATERAL SACROILIITIS (CMS-HCC): Primary | ICD-10-CM

## 2024-11-04 ENCOUNTER — APPOINTMENT (OUTPATIENT)
Dept: PRIMARY CARE | Facility: CLINIC | Age: 59
End: 2024-11-04
Payer: COMMERCIAL

## 2024-11-04 ENCOUNTER — LAB (OUTPATIENT)
Dept: LAB | Facility: LAB | Age: 59
End: 2024-11-04
Payer: COMMERCIAL

## 2024-11-04 VITALS
BODY MASS INDEX: 40.68 KG/M2 | DIASTOLIC BLOOD PRESSURE: 83 MMHG | TEMPERATURE: 97.3 F | WEIGHT: 237 LBS | RESPIRATION RATE: 20 BRPM | SYSTOLIC BLOOD PRESSURE: 128 MMHG | HEART RATE: 67 BPM | OXYGEN SATURATION: 96 %

## 2024-11-04 DIAGNOSIS — I10 HYPERTENSION, UNSPECIFIED TYPE: ICD-10-CM

## 2024-11-04 DIAGNOSIS — E78.5 HYPERLIPIDEMIA, UNSPECIFIED HYPERLIPIDEMIA TYPE: ICD-10-CM

## 2024-11-04 DIAGNOSIS — E66.01 MORBID OBESITY WITH BMI OF 40.0-44.9, ADULT (MULTI): ICD-10-CM

## 2024-11-04 DIAGNOSIS — E55.9 VITAMIN D DEFICIENCY: ICD-10-CM

## 2024-11-04 DIAGNOSIS — Z78.0 POST-MENOPAUSAL: ICD-10-CM

## 2024-11-04 DIAGNOSIS — J32.9 CHRONIC SINUSITIS, UNSPECIFIED LOCATION: ICD-10-CM

## 2024-11-04 DIAGNOSIS — Z00.00 ROUTINE GENERAL MEDICAL EXAMINATION AT A HEALTH CARE FACILITY: Primary | ICD-10-CM

## 2024-11-04 DIAGNOSIS — G62.9 NEUROPATHY: ICD-10-CM

## 2024-11-04 DIAGNOSIS — Z12.11 SCREENING FOR COLON CANCER: ICD-10-CM

## 2024-11-04 LAB — 25(OH)D3 SERPL-MCNC: 47 NG/ML (ref 30–100)

## 2024-11-04 PROCEDURE — 36415 COLL VENOUS BLD VENIPUNCTURE: CPT

## 2024-11-04 PROCEDURE — 99396 PREV VISIT EST AGE 40-64: CPT | Performed by: FAMILY MEDICINE

## 2024-11-04 PROCEDURE — 82306 VITAMIN D 25 HYDROXY: CPT

## 2024-11-04 RX ORDER — HYDROCHLOROTHIAZIDE 12.5 MG/1
12.5 TABLET ORAL DAILY
Qty: 90 TABLET | Refills: 1 | Status: SHIPPED | OUTPATIENT
Start: 2024-11-04 | End: 2025-05-03

## 2024-11-04 RX ORDER — ROSUVASTATIN CALCIUM 5 MG/1
5 TABLET, COATED ORAL EVERY EVENING
Qty: 90 TABLET | Refills: 1 | Status: SHIPPED | OUTPATIENT
Start: 2024-11-04 | End: 2025-05-03

## 2024-11-04 RX ORDER — FLUTICASONE PROPIONATE 50 MCG
2 SPRAY, SUSPENSION (ML) NASAL DAILY
Qty: 16 G | Refills: 3 | Status: SHIPPED | OUTPATIENT
Start: 2024-11-04

## 2024-11-04 RX ORDER — GABAPENTIN 100 MG/1
100 CAPSULE ORAL 3 TIMES DAILY
Qty: 90 CAPSULE | Refills: 1 | Status: SHIPPED | OUTPATIENT
Start: 2024-11-04 | End: 2025-02-02

## 2024-11-04 ASSESSMENT — ENCOUNTER SYMPTOMS
DEPRESSION: 0
LOSS OF SENSATION IN FEET: 0
OCCASIONAL FEELINGS OF UNSTEADINESS: 0

## 2024-11-04 ASSESSMENT — PATIENT HEALTH QUESTIONNAIRE - PHQ9
SUM OF ALL RESPONSES TO PHQ9 QUESTIONS 1 AND 2: 0
2. FEELING DOWN, DEPRESSED OR HOPELESS: NOT AT ALL
1. LITTLE INTEREST OR PLEASURE IN DOING THINGS: NOT AT ALL

## 2024-11-04 NOTE — PROGRESS NOTES
Subjective   Patient ID: Lise Herring is a 59 y.o. female who presents for Annual Exam (Patient reported health Good//Regular Dental Visits yes//Regular Eye Visits yes//Hearing Loss no//Balanced Diet yes//Weight Concerns yes//Exercise Regular///).    HPI  Patient comes in today for complete physical exam.  She was just  last week on [place].  She comes in today complaining of some burning pain in her right leg.  She has had burning pain in the right lateral thigh for some time but now it is down into her calf and right foot.    Patient has been getting RFA with through Dr. Alexis pain management.    Patient is on CPAP for FLAKITA and uses it faithfully.    Patient had mammograms in April.  She has not had a Cologuard or DEXA scan.    Review of Systems   All other systems reviewed and are negative.      Objective   Vitals:  /83   Pulse 67   Temp 36.3 °C (97.3 °F)   Resp 20   Wt 108 kg (237 lb)   SpO2 96%   BMI 40.68 kg/m²     Physical Exam  Vitals reviewed.   Constitutional:       Appearance: She is morbidly obese.   HENT:      Head: Normocephalic and atraumatic.      Right Ear: Tympanic membrane, ear canal and external ear normal.      Left Ear: Tympanic membrane, ear canal and external ear normal.      Nose: Nose normal.      Mouth/Throat:      Mouth: Mucous membranes are moist.      Pharynx: Oropharynx is clear.   Eyes:      Extraocular Movements: Extraocular movements intact.      Conjunctiva/sclera: Conjunctivae normal.      Pupils: Pupils are equal, round, and reactive to light.   Cardiovascular:      Rate and Rhythm: Normal rate and regular rhythm.      Heart sounds: Normal heart sounds. No murmur heard.  Pulmonary:      Effort: Pulmonary effort is normal.      Breath sounds: Normal breath sounds. No wheezing.   Abdominal:      General: Abdomen is flat. Bowel sounds are normal.      Palpations: Abdomen is soft.   Musculoskeletal:         General: Normal range of motion.   Skin:      General: Skin is warm and dry.      Comments: Horizontal scar present over sternum between breasts   Neurological:      General: No focal deficit present.      Mental Status: She is alert and oriented to person, place, and time. Mental status is at baseline.   Psychiatric:         Mood and Affect: Mood normal.         Behavior: Behavior normal.         Thought Content: Thought content normal.         Judgment: Judgment normal.       Assessment/Plan   Problem List Items Addressed This Visit       Hyperlipidemia    Relevant Medications    rosuvastatin (Crestor) 5 mg tablet    HTN (hypertension)    Relevant Medications    hydroCHLOROthiazide (Microzide) 12.5 mg tablet    Chronic sinusitis    Relevant Medications    fluticasone (Flonase) 50 mcg/actuation nasal spray    Vitamin D deficiency    Relevant Orders    Vitamin D 25-Hydroxy,Total (for eval of Vitamin D levels) (Completed)     Other Visit Diagnoses       Routine general medical examination at a health care facility    -  Primary    Morbid obesity with BMI of 40.0-44.9, adult (Multi)        Relevant Orders    Referral to Clinical Pharmacy    Screening for colon cancer        Relevant Orders    Cologuard® colon cancer screening    Post-menopausal        Relevant Orders    XR DEXA bone density    Neuropathy        Relevant Medications    gabapentin (Neurontin) 100 mg capsule        Take new medication as directed.  Continue other medications as directed.  Will ask clinical pharmacy to assist in weight loss management.  RTC in one month for recheck, sooner should problems arise.  Medication list reconciled.  Thank you for visiting today!      Professional services: Some of this note was completed using Dragon voice technology and sometimes the software misinterprets words. This may include unintended errors with respect to translation of words, typographical errors or grammar errors which may not have been identified prior to finalization of the chart note. Please  take this into account when reading the note. Thank you.       Sriram Woodson DO 11/05/24 7:09 AM

## 2024-11-05 NOTE — RESULT ENCOUNTER NOTE
Michael Lezama,    Your vitamin D level was excellent at 47.  It should be between 30 and 100 the closer to 50 the better. It is an important vitamin for your heart, lungs, immune system and bones among other things in your body.  You may want to continue over the counter vitamin D3 2000 units daily to keep it in the normal range.    Will recheck with all your labs in May next year.    Have a Great Day!!!    Dr. Woodson

## 2024-11-12 ENCOUNTER — APPOINTMENT (OUTPATIENT)
Dept: PHARMACY | Facility: HOSPITAL | Age: 59
End: 2024-11-12
Payer: COMMERCIAL

## 2024-11-12 DIAGNOSIS — E66.01 MORBID OBESITY WITH BMI OF 40.0-44.9, ADULT (MULTI): ICD-10-CM

## 2024-11-13 DIAGNOSIS — G43.119 INTRACTABLE MIGRAINE WITH AURA WITHOUT STATUS MIGRAINOSUS: Primary | ICD-10-CM

## 2024-11-14 RX ORDER — NAPROXEN 500 MG/1
TABLET ORAL
Qty: 45 TABLET | Refills: 0 | Status: SHIPPED | OUTPATIENT
Start: 2024-11-14

## 2024-11-15 NOTE — PROGRESS NOTES
Subjective   Patient ID: Lise Herring is a 59 y.o. female who presents for weight loss and Glp-1 initiation.    Referring Provider: DO SAVANNAH Thomas    Review of Systems    Objective     Labs  Lab Results   Component Value Date    BILITOT 0.8 05/04/2024    CALCIUM 9.5 05/04/2024    CO2 25 05/04/2024     05/04/2024    CREATININE 0.77 05/04/2024    GLUCOSE 102 (H) 05/04/2024    ALKPHOS 67 05/04/2024    K 4.1 05/04/2024    PROT 7.1 05/04/2024     05/04/2024    AST 19 05/04/2024    ALT 21 05/04/2024    BUN 16 05/04/2024    ANIONGAP 13 05/04/2024    MG 2.05 10/12/2019    ALBUMIN 4.4 05/04/2024    GFRF 73 04/13/2023     Lab Results   Component Value Date    TRIG 162 (H) 05/04/2024    CHOL 175 05/04/2024    LDLCALC 89 05/04/2024    HDL 53.3 05/04/2024     Lab Results   Component Value Date    HGBA1C 5.7 (H) 05/04/2024     Assessment/Plan   Patient was refereed to see if insurance will cover Glp-1's for weight loss. Ran test claims and insurance may cover Mounjaro. Will submit PA and follow up in 1 week for insurance determination.    PLAN:  1. Follow up in 1 week    Continue all meds under the continuation of care with the referring provider and clinical pharmacy team.    Kianna Laureano, PharmD

## 2024-11-18 ENCOUNTER — PATIENT MESSAGE (OUTPATIENT)
Dept: PRIMARY CARE | Facility: CLINIC | Age: 59
End: 2024-11-18
Payer: COMMERCIAL

## 2024-11-18 DIAGNOSIS — B00.1 RECURRENT COLD SORES: ICD-10-CM

## 2024-11-19 ENCOUNTER — APPOINTMENT (OUTPATIENT)
Dept: PHARMACY | Facility: HOSPITAL | Age: 59
End: 2024-11-19
Payer: COMMERCIAL

## 2024-11-19 DIAGNOSIS — E66.01 MORBID OBESITY WITH BMI OF 40.0-44.9, ADULT (MULTI): ICD-10-CM

## 2024-11-19 RX ORDER — VALACYCLOVIR HYDROCHLORIDE 1 G/1
2000 TABLET, FILM COATED ORAL 2 TIMES DAILY
Qty: 20 TABLET | Refills: 1 | Status: SHIPPED | OUTPATIENT
Start: 2024-11-19

## 2024-11-19 NOTE — PATIENT COMMUNICATION
Pt last OV 11/4/2024    Requested Prescriptions     Pending Prescriptions Disp Refills    valACYclovir (Valtrex) 1 gram tablet 12 tablet 1     Sig: Take 2 tablets (2,000 mg) by mouth 2 times a day.

## 2024-11-25 NOTE — PROGRESS NOTES
Subjective   Patient ID: Lise Herring is a 59 y.o. female who presents for weight loss and Glp-1 initiation.    Referring Provider: DO SAVANNAH Thomas    Review of Systems    Objective     Labs  Lab Results   Component Value Date    BILITOT 0.8 05/04/2024    CALCIUM 9.5 05/04/2024    CO2 25 05/04/2024     05/04/2024    CREATININE 0.77 05/04/2024    GLUCOSE 102 (H) 05/04/2024    ALKPHOS 67 05/04/2024    K 4.1 05/04/2024    PROT 7.1 05/04/2024     05/04/2024    AST 19 05/04/2024    ALT 21 05/04/2024    BUN 16 05/04/2024    ANIONGAP 13 05/04/2024    MG 2.05 10/12/2019    ALBUMIN 4.4 05/04/2024    GFRF 73 04/13/2023     Lab Results   Component Value Date    TRIG 162 (H) 05/04/2024    CHOL 175 05/04/2024    LDLCALC 89 05/04/2024    HDL 53.3 05/04/2024     Lab Results   Component Value Date    HGBA1C 5.7 (H) 05/04/2024     Assessment/Plan   PA was denied for Ozempic. Discussed other alternatives, like phentermine, Qysmia, and Contrave. Also discussed compounded Wegovy through [facility]. Patient to discuss with PCP. Will follow up as needed.    PLAN:  1. Follow up as needed    Continue all meds under the continuation of care with the referring provider and clinical pharmacy team.    Kianna Laureano, PharmD

## 2024-12-02 DIAGNOSIS — I10 HYPERTENSION, UNSPECIFIED TYPE: ICD-10-CM

## 2024-12-02 DIAGNOSIS — G43.119 INTRACTABLE MIGRAINE WITH AURA WITHOUT STATUS MIGRAINOSUS: ICD-10-CM

## 2024-12-02 RX ORDER — NAPROXEN 500 MG/1
500 TABLET ORAL
Qty: 180 TABLET | Refills: 0 | OUTPATIENT
Start: 2024-12-02 | End: 2025-03-02

## 2024-12-02 RX ORDER — ATENOLOL 50 MG/1
50 TABLET ORAL DAILY
Qty: 90 TABLET | Refills: 3 | OUTPATIENT
Start: 2024-12-02

## 2024-12-04 ENCOUNTER — TELEPHONE (OUTPATIENT)
Dept: PAIN MEDICINE | Facility: CLINIC | Age: 59
End: 2024-12-04
Payer: COMMERCIAL

## 2024-12-04 NOTE — TELEPHONE ENCOUNTER
She had the bilateral SI injections and was scheduled for the RFA of the SI bilat 12/5/24 (was cancelled due to insurance stating it is investigational)  she has had RFA of SI in the past so while she is waiting for the insurance approval what can she do for the painb she is requesting something for her pain  Please advise

## 2024-12-04 NOTE — TELEPHONE ENCOUNTER
"Placed call to patient, left detailed voicemail--procedure is cancelled tomorrow due to \"no coverage for this type of injection\" per Amirah's email.  "

## 2024-12-05 ENCOUNTER — APPOINTMENT (OUTPATIENT)
Dept: PAIN MEDICINE | Facility: CLINIC | Age: 59
End: 2024-12-05
Payer: COMMERCIAL

## 2024-12-05 ENCOUNTER — APPOINTMENT (OUTPATIENT)
Dept: PRIMARY CARE | Facility: CLINIC | Age: 59
End: 2024-12-05
Payer: COMMERCIAL

## 2024-12-05 NOTE — TELEPHONE ENCOUNTER
I cannot give her a 90-day supply of any medication as she was due for an appointment in December and it looks like she canceled that and moved out to January.  I can only send her a 30-day supply.  Please verify with her where she would need that sent to

## 2024-12-17 RX ORDER — ATENOLOL 50 MG/1
50 TABLET ORAL DAILY
Qty: 90 TABLET | Refills: 1 | OUTPATIENT
Start: 2024-12-17 | End: 2025-06-15

## 2024-12-17 RX ORDER — NAPROXEN 500 MG/1
500 TABLET ORAL
Qty: 45 TABLET | Refills: 0 | OUTPATIENT
Start: 2024-12-17

## 2024-12-19 ENCOUNTER — APPOINTMENT (OUTPATIENT)
Dept: PRIMARY CARE | Facility: CLINIC | Age: 59
End: 2024-12-19
Payer: COMMERCIAL

## 2024-12-19 ENCOUNTER — APPOINTMENT (OUTPATIENT)
Dept: NEUROLOGY | Facility: CLINIC | Age: 59
End: 2024-12-19
Payer: COMMERCIAL

## 2024-12-19 VITALS
RESPIRATION RATE: 16 BRPM | HEART RATE: 63 BPM | BODY MASS INDEX: 42.57 KG/M2 | WEIGHT: 248 LBS | OXYGEN SATURATION: 96 % | DIASTOLIC BLOOD PRESSURE: 84 MMHG | TEMPERATURE: 97.6 F | SYSTOLIC BLOOD PRESSURE: 127 MMHG

## 2024-12-19 DIAGNOSIS — E66.01 MORBID OBESITY WITH BMI OF 40.0-44.9, ADULT (MULTI): ICD-10-CM

## 2024-12-19 DIAGNOSIS — Z79.899 LONG TERM USE OF DRUG: Primary | ICD-10-CM

## 2024-12-19 DIAGNOSIS — I10 HYPERTENSION, ESSENTIAL, BENIGN: ICD-10-CM

## 2024-12-19 DIAGNOSIS — E78.2 HYPERLIPIDEMIA, MIXED: ICD-10-CM

## 2024-12-19 DIAGNOSIS — Z98.84 BARIATRIC SURGERY STATUS: ICD-10-CM

## 2024-12-19 DIAGNOSIS — G62.9 NEUROPATHY: ICD-10-CM

## 2024-12-19 LAB
AMPHETAMINES UR QL SCN: NORMAL
BARBITURATES UR QL SCN: NORMAL
BENZODIAZ UR QL SCN: NORMAL
BZE UR QL SCN: NORMAL
CANNABINOIDS UR QL SCN: NORMAL
FENTANYL+NORFENTANYL UR QL SCN: NORMAL
METHADONE UR QL SCN: NORMAL
OPIATES UR QL SCN: NORMAL
OXYCODONE+OXYMORPHONE UR QL SCN: NORMAL
PCP UR QL SCN: NORMAL

## 2024-12-19 PROCEDURE — 99214 OFFICE O/P EST MOD 30 MIN: CPT | Performed by: FAMILY MEDICINE

## 2024-12-19 PROCEDURE — 80307 DRUG TEST PRSMV CHEM ANLYZR: CPT

## 2024-12-19 NOTE — PROGRESS NOTES
Subjective   Patient ID: Lise Herring is a 59 y.o. female who presents for Follow-up (Was given gabapentin for neuropathy at last visit but she stopped taking it because it made her dizzy for 2 weeks. She has consulted with Chiqui regarding this).    HPI  Patient presents today for 6-week checkup and follow-up on her meds. She currently is without complaints. She states that she is taking her medicines as directed and is not having any side effects from them.  She did stop her gabapentin however as it was causing her to be dizzy.  Interesting however if she claims the pain in her right foot is now gone.    Patient also was prescribed Ozempic for her morbid obesity and it was denied by her insurance.  We had a lengthy discussion today about alternatives and at this point we are going to try the compounded drug through [facility].  If it is too costly we will go back to using phentermine and add Topamax.  A new CSA and RUDS was done today for phentermine.    11/4/2024  Patient comes in today for complete physical exam.  She was just  last week on [facility].  She comes in today complaining of some burning pain in her right leg.  She has had burning pain in the right lateral thigh for some time but now it is down into her calf and right foot.    Patient has been getting RFA with through Dr. Alexis pain management.    Patient is on CPAP for FLAKITA and uses it faithfully.    Patient had mammograms in April.  She has not had a Cologuard or DEXA scan.    Review of Systems   All other systems reviewed and are negative.      Objective   Vitals:  /84   Pulse 63   Temp 36.4 °C (97.6 °F)   Resp 16   Wt 112 kg (248 lb)   SpO2 96%   BMI 42.57 kg/m²     Physical Exam  Vitals reviewed.   Constitutional:       Appearance: She is morbidly obese.   HENT:      Head: Normocephalic and atraumatic.      Right Ear: Tympanic membrane, ear canal and external ear normal.      Left Ear: Tympanic membrane, ear canal and  external ear normal.      Nose: Nose normal.      Mouth/Throat:      Mouth: Mucous membranes are moist.      Pharynx: Oropharynx is clear.   Eyes:      Extraocular Movements: Extraocular movements intact.      Conjunctiva/sclera: Conjunctivae normal.      Pupils: Pupils are equal, round, and reactive to light.   Cardiovascular:      Rate and Rhythm: Normal rate and regular rhythm.      Heart sounds: Normal heart sounds. No murmur heard.  Pulmonary:      Effort: Pulmonary effort is normal.      Breath sounds: Normal breath sounds. No wheezing.   Abdominal:      General: Abdomen is flat. Bowel sounds are normal.      Palpations: Abdomen is soft.   Musculoskeletal:         General: Normal range of motion.   Skin:     General: Skin is warm and dry.      Comments: Scar present over sternum between breasts   Neurological:      General: No focal deficit present.      Mental Status: She is alert and oriented to person, place, and time. Mental status is at baseline.   Psychiatric:         Mood and Affect: Mood normal.         Behavior: Behavior normal.         Thought Content: Thought content normal.         Judgment: Judgment normal.       Assessment/Plan   Problem List Items Addressed This Visit       Hyperlipidemia, mixed    Bariatric surgery status    Hypertension, essential, benign    Neuropathy    Morbid obesity with BMI of 40.0-44.9, adult (Multi)    Relevant Medications    semaglutide, weight loss, (Wegovy) 0.25 mg/0.5 mL pen injector (Start on 12/22/2024)    Long term use of drug - Primary    Relevant Orders    Drug Screen, Urine With Reflex to Confirmation (Completed)   CSA and RUDS today  Take new medication as directed.  Continue other medications as directed.  RTC in one month for recheck, sooner should problems arise.  Medication list reconciled.  Thank you for visiting today!      Professional services: Some of this note was completed using Dragon voice technology and sometimes the software misinterprets  words. This may include unintended errors with respect to translation of words, typographical errors or grammar errors which may not have been identified prior to finalization of the chart note. Please take this into account when reading the note. Thank you.         Sriram Woodson DO 12/20/24 7:14 AM

## 2024-12-20 PROBLEM — Z79.899 LONG TERM USE OF DRUG: Status: ACTIVE | Noted: 2024-12-20

## 2024-12-20 PROBLEM — E66.01 MORBID OBESITY WITH BMI OF 40.0-44.9, ADULT (MULTI): Status: ACTIVE | Noted: 2024-12-20

## 2024-12-20 PROBLEM — E78.2 HYPERLIPIDEMIA, MIXED: Status: ACTIVE | Noted: 2023-03-13

## 2024-12-20 PROBLEM — G62.9 NEUROPATHY: Status: ACTIVE | Noted: 2024-12-20

## 2024-12-20 RX ORDER — SEMAGLUTIDE 0.25 MG/.5ML
0.25 INJECTION, SOLUTION SUBCUTANEOUS
Qty: 2 ML | Refills: 0 | Status: SHIPPED | OUTPATIENT
Start: 2024-12-22 | End: 2025-01-13

## 2025-01-08 ENCOUNTER — TELEMEDICINE (OUTPATIENT)
Dept: NEUROLOGY | Facility: CLINIC | Age: 60
End: 2025-01-08

## 2025-01-08 DIAGNOSIS — G43.119 INTRACTABLE MIGRAINE WITH AURA WITHOUT STATUS MIGRAINOSUS: Primary | ICD-10-CM

## 2025-01-08 DIAGNOSIS — I10 HYPERTENSION, UNSPECIFIED TYPE: ICD-10-CM

## 2025-01-08 PROCEDURE — 99214 OFFICE O/P EST MOD 30 MIN: CPT | Performed by: NURSE PRACTITIONER

## 2025-01-08 PROCEDURE — 99214 OFFICE O/P EST MOD 30 MIN: CPT | Mod: 95 | Performed by: NURSE PRACTITIONER

## 2025-01-08 RX ORDER — NAPROXEN 500 MG/1
500 TABLET ORAL EVERY 12 HOURS PRN
Qty: 60 TABLET | Refills: 1 | Status: SHIPPED | OUTPATIENT
Start: 2025-01-08

## 2025-01-08 RX ORDER — ATENOLOL 50 MG/1
50 TABLET ORAL DAILY
Qty: 90 TABLET | Refills: 1 | Status: SHIPPED | OUTPATIENT
Start: 2025-01-08

## 2025-01-08 NOTE — PROGRESS NOTES
Virtual or Telephone Consent    An interactive audio and video telecommunication system which permits real time communications between the patient (at the originating site) and provider (at the distant site) was utilized to provide this telehealth service.   Verbal consent was requested and obtained from Lise Herring on this date, 01/08/25 for a telehealth visit.     Subjective   HPI  Lise Herring is a 59 y.o. year old female who presents with chief complaint Intractable migraine with aura without status migrainosus [G43.119]    Lise is experiencing 4-6 HA days per month, 4-6 of the HAs meet migraine criteria.   Triggers include barometric pressure .  Aura  Naproxen helps to make the breakthrough activity manageable.        Current/ past HA treatments:  Preventive:    Rescue:      Current Outpatient Medications:     acyclovir (Zovirax) 5 % cream, apply topically 5 (FIVE) times a day for FOUR days, Disp: , Rfl:     atenolol (Tenormin) 50 mg tablet, Take 1 tablet (50 mg) by mouth once daily., Disp: 90 tablet, Rfl: 1    fluticasone (Flonase) 50 mcg/actuation nasal spray, Administer 2 sprays into each nostril once daily., Disp: 16 g, Rfl: 3    hydroCHLOROthiazide (Microzide) 12.5 mg tablet, Take 1 tablet (12.5 mg) by mouth once daily., Disp: 90 tablet, Rfl: 1    multivitamin tablet, Take 1 tablet by mouth once daily., Disp: , Rfl:     naproxen (Naprosyn) 500 mg tablet, TAKE 1 TABLET EVERY 12 HOURS AS NEEDED FOR HEADACHES, Disp: 45 tablet, Rfl: 0    rosuvastatin (Crestor) 5 mg tablet, Take 1 tablet (5 mg) by mouth once daily in the evening., Disp: 90 tablet, Rfl: 1    semaglutide, weight loss, (Wegovy) 0.25 mg/0.5 mL pen injector, Inject 0.25 mg under the skin 1 (one) time per week for 4 doses., Disp: 2 mL, Rfl: 0    valACYclovir (Valtrex) 1 gram tablet, Take 2 tablets (2,000 mg) by mouth 2 times a day. For 1 day, Disp: 20 tablet, Rfl: 1    Allergies   Allergen Reactions    Atorvastatin Other     Body aches      Codeine Nausea/vomiting    Gabapentin Dizziness    Hydrocodone-Acetaminophen Nausea Only and Other    Other Diarrhea    Verapamil Nausea Only and Other       Past Medical History:   Diagnosis Date    Displacement of lumbar intervertebral disc without myelopathy 06/19/2007    Encounter for gynecological examination (general) (routine) without abnormal findings 09/04/2019    Well woman exam with routine gynecological exam    Ganglion of right wrist 05/23/2023    Mass of right wrist 05/23/2023    Migraine, unspecified, not intractable, without status migrainosus 10/24/2022    Migraine    Personal history of other specified conditions     History of vertigo    Primary cough headache 01/10/2019    Primary cough headache     Past Surgical History:   Procedure Laterality Date    OTHER SURGICAL HISTORY  04/18/2019    Hysterectomy     No family history on file.  Social History     Tobacco Use    Smoking status: Former     Types: Cigarettes     Passive exposure: Past    Smokeless tobacco: Never   Substance Use Topics    Alcohol use: Never     Social History     Substance and Sexual Activity   Drug Use Never        ROS  As noted in HPI, otherwise all other systems have been reviewed are negative for complaint.     Objective   General Appearance: Lise is well-developed, well-nourished, 59 y.o. year old female, in no acute distress. Makes good eye contact, is alert, interactive, and cooperative. Demonstrates recent & remote memory recall. Subjective information consistent with objective assessment.       Lab Results   Component Value Date    WBC 6.2 05/04/2024    RBC 4.29 05/04/2024    HGB 13.1 05/04/2024    HCT 39.8 05/04/2024     05/04/2024     05/04/2024    K 4.1 05/04/2024     05/04/2024    BUN 16 05/04/2024    CREATININE 0.77 05/04/2024    EGFR 90 05/04/2024    CALCIUM 9.5 05/04/2024    ALKPHOS 67 05/04/2024    AST 19 05/04/2024    ALT 21 05/04/2024    MG 2.05 10/12/2019    RAIEBRUK55 401 05/04/2024     VITD25 47 11/04/2024    HGBA1C 5.7 (H) 05/04/2024    LDLCALC 89 05/04/2024    CHOL 175 05/04/2024    HDL 53.3 05/04/2024    TRIG 162 (H) 05/04/2024    TSH 0.81 05/04/2024          Assessment & Plan  Hypertension, unspecified type         Intractable migraine with aura without status migrainosus              ASSESSMENT/PLAN:  Continue atenolol for preventative treatment.  Continue naproxen for abortive treatment.  Follow-up in 6 months or sooner if needed.    Nina Tamez, APRN-CNP

## 2025-01-23 ENCOUNTER — PATIENT MESSAGE (OUTPATIENT)
Dept: PRIMARY CARE | Facility: CLINIC | Age: 60
End: 2025-01-23
Payer: COMMERCIAL

## 2025-02-10 ENCOUNTER — APPOINTMENT (OUTPATIENT)
Dept: PRIMARY CARE | Facility: CLINIC | Age: 60
End: 2025-02-10
Payer: COMMERCIAL

## 2025-03-10 DIAGNOSIS — I10 HYPERTENSION, UNSPECIFIED TYPE: ICD-10-CM

## 2025-03-12 ENCOUNTER — PATIENT MESSAGE (OUTPATIENT)
Dept: PRIMARY CARE | Facility: CLINIC | Age: 60
End: 2025-03-12
Payer: COMMERCIAL

## 2025-03-12 RX ORDER — HYDROCHLOROTHIAZIDE 12.5 MG/1
12.5 TABLET ORAL DAILY
Qty: 90 TABLET | Refills: 1 | Status: SHIPPED | OUTPATIENT
Start: 2025-03-12

## 2025-03-12 NOTE — TELEPHONE ENCOUNTER
Patient also notified by [facility] that a refill is needed. Patient sent a [employer] message as well.     Patient requests prescription below    Last Office Visit: 12/19/2024   Next Office Visit: 4/7/2025     Requested Prescriptions     Pending Prescriptions Disp Refills    hydroCHLOROthiazide (Microzide) 12.5 mg tablet [Pharmacy Med Name: HYDROCHLOROTHIAZIDE TABS 12.5MG] 90 tablet 3     Sig: TAKE 1 TABLET DAILY

## 2025-04-07 ENCOUNTER — APPOINTMENT (OUTPATIENT)
Dept: PRIMARY CARE | Facility: CLINIC | Age: 60
End: 2025-04-07
Payer: COMMERCIAL

## 2025-04-10 ENCOUNTER — APPOINTMENT (OUTPATIENT)
Dept: OBSTETRICS AND GYNECOLOGY | Facility: CLINIC | Age: 60
End: 2025-04-10
Payer: COMMERCIAL

## 2025-04-10 VITALS — WEIGHT: 239 LBS | DIASTOLIC BLOOD PRESSURE: 71 MMHG | SYSTOLIC BLOOD PRESSURE: 103 MMHG | BODY MASS INDEX: 41.02 KG/M2

## 2025-04-10 DIAGNOSIS — Z12.31 ENCOUNTER FOR SCREENING MAMMOGRAM FOR MALIGNANT NEOPLASM OF BREAST: Primary | ICD-10-CM

## 2025-04-10 DIAGNOSIS — Z01.419 WELL WOMAN EXAM: ICD-10-CM

## 2025-04-10 PROCEDURE — 99396 PREV VISIT EST AGE 40-64: CPT | Performed by: OBSTETRICS & GYNECOLOGY

## 2025-04-10 PROCEDURE — 3078F DIAST BP <80 MM HG: CPT | Performed by: OBSTETRICS & GYNECOLOGY

## 2025-04-10 PROCEDURE — 1036F TOBACCO NON-USER: CPT | Performed by: OBSTETRICS & GYNECOLOGY

## 2025-04-10 PROCEDURE — 3074F SYST BP LT 130 MM HG: CPT | Performed by: OBSTETRICS & GYNECOLOGY

## 2025-04-10 ASSESSMENT — SOCIAL DETERMINANTS OF HEALTH (SDOH)
WITHIN THE LAST YEAR, HAVE YOU BEEN HUMILIATED OR EMOTIONALLY ABUSED IN OTHER WAYS BY YOUR PARTNER OR EX-PARTNER?: NO
WITHIN THE LAST YEAR, HAVE YOU BEEN KICKED, HIT, SLAPPED, OR OTHERWISE PHYSICALLY HURT BY YOUR PARTNER OR EX-PARTNER?: NO
WITHIN THE LAST YEAR, HAVE YOU BEEN AFRAID OF YOUR PARTNER OR EX-PARTNER?: NO
WITHIN THE LAST YEAR, HAVE TO BEEN RAPED OR FORCED TO HAVE ANY KIND OF SEXUAL ACTIVITY BY YOUR PARTNER OR EX-PARTNER?: NO

## 2025-04-10 ASSESSMENT — LIFESTYLE VARIABLES
AUDIT-C TOTAL SCORE: 1
HOW MANY STANDARD DRINKS CONTAINING ALCOHOL DO YOU HAVE ON A TYPICAL DAY: 1 OR 2
HOW OFTEN DO YOU HAVE A DRINK CONTAINING ALCOHOL: MONTHLY OR LESS
HOW OFTEN DO YOU HAVE SIX OR MORE DRINKS ON ONE OCCASION: NEVER
SKIP TO QUESTIONS 9-10: 1

## 2025-04-10 NOTE — PROGRESS NOTES
Last pap: 2023, Neg. HPV -  Last mamm: 2024  LMP: Absent since   Contraception: Hx of hysterectomy   Sexually active: Yes  Self breast exam: Yes      CC:    Chief Complaint   Patient presents with    Annual Exam         HPI:  Lise Quinones is here for a routine GYN examination.   Amenorrheic in menopause.  Had laparoscopic supracervical hyster, RSO for benign reasons.  Sexually active.  No dryness or urinary issues.  G0  Colon cancer screening:  cologuard through PCP  Mammography: scheduled tmw  Last Pap:  3/28/23 NILM hpv neg        ROS:    GI - no hematochezia/constipation/diarrhea  URO - no hematuria/dysuria/urinary frequency  GYN - no vaginal discharge/dyspareunia/dysmenorrhea/pelvic pain  PSYCH - mood OK    PMH:   Past Medical History:   Diagnosis Date    Displacement of lumbar intervertebral disc without myelopathy 2007    Encounter for gynecological examination (general) (routine) without abnormal findings 2019    Well woman exam with routine gynecological exam    Ganglion of right wrist 2023    Mass of right wrist 2023    Migraine, unspecified, not intractable, without status migrainosus 10/24/2022    Migraine    Personal history of other specified conditions     History of vertigo    Primary cough headache 01/10/2019    Primary cough headache       PSH:   Past Surgical History:   Procedure Laterality Date    OTHER SURGICAL HISTORY  2019    Hysterectomy       OB History    Para Term  AB Living   0 0 0 0 0 0   SAB IAB Ectopic Multiple Live Births   0 0 0 0 0       Soc:   Social History     Social History Narrative    Not on file     Occupation/education: lives with  just  last yaer  Activity/hobbies: cares for mom who had cVA, dementia    Fam Hx: No family history on file.      PHYSICAL EXAM:  /71   Wt 108 kg (239 lb)   BMI 41.02 kg/m²   GEN:  A&O, NAD  HEENT  head NC/AT, conjunctiva clear, no visible goiter  CV:  regular  pulse rate and rhythm, no visible JVD  RESP:  symmetric respirations, no audible wheezing  ABD:  NT/ND, soft, no palpable masses  URO:  normal urethra, no bladder TTP  GYN:  normal vulva and perineum w/o lesions or ulcers, normal vagina without discharge or lesions, normal cervix without lesions or discharge or CMT, surgically absent, adnexa mobile and NT/NE  BREAST:  no masses or TTP, no skin lesions or nipple discharge  DERM:  no hirsutism or acne   PSYCH:  normal affect, non-anxious        IMPRESSION/PLAN:  Problem List Items Addressed This Visit    None  Visit Diagnoses       Encounter for screening mammogram for malignant neoplasm of breast    -  Primary    Relevant Orders    BI mammo bilateral screening tomosynthesis          Cologuard through PCP  PAP next year, still has cervix    Berkley Morales MD

## 2025-04-11 ENCOUNTER — APPOINTMENT (OUTPATIENT)
Dept: RADIOLOGY | Facility: CLINIC | Age: 60
End: 2025-04-11
Payer: COMMERCIAL

## 2025-04-11 ENCOUNTER — APPOINTMENT (OUTPATIENT)
Dept: OBSTETRICS AND GYNECOLOGY | Facility: CLINIC | Age: 60
End: 2025-04-11
Payer: COMMERCIAL

## 2025-04-11 DIAGNOSIS — Z12.31 SCREENING MAMMOGRAM FOR BREAST CANCER: ICD-10-CM

## 2025-04-14 ENCOUNTER — APPOINTMENT (OUTPATIENT)
Dept: PRIMARY CARE | Facility: CLINIC | Age: 60
End: 2025-04-14
Payer: COMMERCIAL

## 2025-04-14 VITALS
DIASTOLIC BLOOD PRESSURE: 77 MMHG | OXYGEN SATURATION: 94 % | BODY MASS INDEX: 40.51 KG/M2 | TEMPERATURE: 97.3 F | RESPIRATION RATE: 20 BRPM | HEART RATE: 84 BPM | WEIGHT: 236 LBS | SYSTOLIC BLOOD PRESSURE: 112 MMHG

## 2025-04-14 DIAGNOSIS — E78.2 HYPERLIPIDEMIA, MIXED: ICD-10-CM

## 2025-04-14 DIAGNOSIS — E66.01 MORBID OBESITY WITH BMI OF 40.0-44.9, ADULT (MULTI): Primary | ICD-10-CM

## 2025-04-14 DIAGNOSIS — K21.9 GASTROESOPHAGEAL REFLUX DISEASE WITHOUT ESOPHAGITIS: ICD-10-CM

## 2025-04-14 DIAGNOSIS — I10 HYPERTENSION, ESSENTIAL, BENIGN: ICD-10-CM

## 2025-04-14 PROCEDURE — 99213 OFFICE O/P EST LOW 20 MIN: CPT | Performed by: FAMILY MEDICINE

## 2025-04-14 RX ORDER — OMEPRAZOLE 20 MG/1
20 CAPSULE, DELAYED RELEASE ORAL
Qty: 90 CAPSULE | Refills: 1 | Status: SHIPPED | OUTPATIENT
Start: 2025-04-14 | End: 2025-10-11

## 2025-04-14 NOTE — PROGRESS NOTES
Subjective   Patient ID: Lise Quinones is a 59 y.o. female who presents for Follow-up.    HPI  Patient comes in today for follow-up on her compounded GLP-1 inhibitor.  She states she is finally starting to lose weight at the current dose of 1.2 mg weekly.  She started the medicine in January at 248 pounds and is 236 pounds today.  She is on shot #3 of the 1.2 mg weekly.  So far she has not noticed any side effects other than some GERD.  She is counteracting that with Prilosec 20 mg as needed.    Review of Systems   All other systems reviewed and are negative.      Objective   Vitals:  /77   Pulse 84   Temp 36.3 °C (97.3 °F)   Resp 20   Wt 107 kg (236 lb)   SpO2 94%   BMI 40.51 kg/m²     Physical Exam  Vitals reviewed.   Constitutional:       Appearance: She is morbidly obese.   HENT:      Head: Normocephalic and atraumatic.      Right Ear: Tympanic membrane, ear canal and external ear normal.      Left Ear: Tympanic membrane, ear canal and external ear normal.      Nose: Nose normal.      Mouth/Throat:      Mouth: Mucous membranes are moist.      Pharynx: Oropharynx is clear.   Eyes:      Extraocular Movements: Extraocular movements intact.      Conjunctiva/sclera: Conjunctivae normal.      Pupils: Pupils are equal, round, and reactive to light.   Cardiovascular:      Rate and Rhythm: Normal rate and regular rhythm.      Heart sounds: Normal heart sounds. No murmur heard.  Pulmonary:      Effort: Pulmonary effort is normal.      Breath sounds: Normal breath sounds. No wheezing.   Abdominal:      General: Abdomen is flat. Bowel sounds are normal.      Palpations: Abdomen is soft.   Musculoskeletal:         General: Normal range of motion.   Skin:     General: Skin is warm and dry.      Comments: Scar present over sternum between breasts   Neurological:      General: No focal deficit present.      Mental Status: She is alert and oriented to person, place, and time. Mental status is at baseline.    Psychiatric:         Mood and Affect: Mood normal.         Behavior: Behavior normal.         Thought Content: Thought content normal.         Judgment: Judgment normal.         CBC -  Recent Labs     05/04/24  0857 04/13/23  1427 02/01/22  0902   WBC 6.2 7.0 6.1   HGB 13.1 13.3 13.8   HCT 39.8 40.7 43.6    279 292   MCV 93 92 95       CMP -  Recent Labs     05/04/24  0857 04/13/23  1427 05/18/22  1455 09/11/20  1145 10/12/19  1208    138 138   < > 139   K 4.1 4.0 3.5   < > 4.3    102 101   < > 108*   CO2 25 28 27   < > 22   ANIONGAP 13 12 14   < > 13   BUN 16 16 14   < > 15   CREATININE 0.77 0.91 0.83   < > 0.89   EGFR 90  --   --   --   --    MG  --   --   --   --  2.05    < > = values in this interval not displayed.     Recent Labs     05/04/24  0857 04/13/23  1427 05/18/22  1455   ALBUMIN 4.4 4.3 4.4   ALKPHOS 67 70 70   ALT 21 38 37   AST 19 33 34   BILITOT 0.8 0.5 0.7       LIPID PANEL -   Recent Labs     05/04/24  0857 07/03/23  1011 05/18/22  1455 06/09/21  1426   CHOL 175 165 168 187   LDLCALC 89  --   --   --    LDLF  --  84 78 83   HDL 53.3 53.9 61.0 65.0   TRIG 162* 134 144 194*       Recent Labs     05/04/24  0857 10/12/19  1208 04/18/19  1549   BNP  --  91  --    HGBA1C 5.7*  --  5.7       Lab Results   Component Value Date    MMITXGIO88 401 05/04/2024       Lab Results   Component Value Date    VITD25 47 11/04/2024        Assessment/Plan   Problem List Items Addressed This Visit       Hyperlipidemia, mixed    Hypertension, essential, benign    Morbid obesity with BMI of 40.0-44.9, adult (Multi) - Primary    Gastroesophageal reflux disease without esophagitis    Relevant Medications    omeprazole (PriLOSEC) 20 mg DR capsule   Continue semaglutide 1.2 mg weekly for 3 more months.  Continue current meds as directed.  Follow up in 3 months for recheck if all remains stable, sooner if problems arise.  Medication list reconciled.  Thank you for visiting today!      Professional  services: Some of this note was completed using Dragon voice technology and sometimes the software misinterprets words. This may include unintended errors with respect to translation of words, typographical errors or grammar errors which may not have been identified prior to finalization of the chart note. Please take this into account when reading the note. Thank you.       Sriram Woodson DO 04/14/25 3:16 PM

## 2025-05-05 ENCOUNTER — APPOINTMENT (OUTPATIENT)
Dept: PRIMARY CARE | Facility: CLINIC | Age: 60
End: 2025-05-05
Payer: COMMERCIAL